# Patient Record
Sex: MALE | Race: WHITE | Employment: OTHER | URBAN - METROPOLITAN AREA
[De-identification: names, ages, dates, MRNs, and addresses within clinical notes are randomized per-mention and may not be internally consistent; named-entity substitution may affect disease eponyms.]

---

## 2021-09-24 ENCOUNTER — HOSPITAL ENCOUNTER (INPATIENT)
Age: 66
LOS: 4 days | Discharge: HOME HEALTH | DRG: 964 | End: 2021-09-28
Attending: EMERGENCY MEDICINE | Admitting: STUDENT IN AN ORGANIZED HEALTH CARE EDUCATION/TRAINING PROGRAM
Payer: MEDICARE

## 2021-09-24 ENCOUNTER — APPOINTMENT (OUTPATIENT)
Dept: CT IMAGING | Age: 66
DRG: 964 | End: 2021-09-24
Payer: MEDICARE

## 2021-09-24 ENCOUNTER — APPOINTMENT (OUTPATIENT)
Dept: GENERAL RADIOLOGY | Age: 66
DRG: 964 | End: 2021-09-24
Payer: MEDICARE

## 2021-09-24 DIAGNOSIS — S37.031A LACERATION OF RIGHT KIDNEY, INITIAL ENCOUNTER: ICD-10-CM

## 2021-09-24 DIAGNOSIS — S22.43XA CLOSED FRACTURE OF MULTIPLE RIBS OF BOTH SIDES, INITIAL ENCOUNTER: Primary | ICD-10-CM
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
## 2021-09-24 LAB
ALBUMIN SERPL-MCNC: 4.5 G/DL (ref 3.5–5.2)
ALP BLD-CCNC: 84 U/L (ref 40–129)
ALT SERPL-CCNC: 32 U/L (ref 0–40)
ANION GAP SERPL CALCULATED.3IONS-SCNC: 10 MMOL/L (ref 7–16)
AST SERPL-CCNC: 40 U/L (ref 0–39)
BASOPHILS ABSOLUTE: 0.03 E9/L (ref 0–0.2)
BASOPHILS RELATIVE PERCENT: 0.5 % (ref 0–2)
BILIRUB SERPL-MCNC: 0.4 MG/DL (ref 0–1.2)
BUN BLDV-MCNC: 19 MG/DL (ref 6–23)
CALCIUM SERPL-MCNC: 9.6 MG/DL (ref 8.6–10.2)
CHLORIDE BLD-SCNC: 106 MMOL/L (ref 98–107)
CO2: 28 MMOL/L (ref 22–29)
CREAT SERPL-MCNC: 1.2 MG/DL (ref 0.7–1.2)
EOSINOPHILS ABSOLUTE: 0.05 E9/L (ref 0.05–0.5)
EOSINOPHILS RELATIVE PERCENT: 0.8 % (ref 0–6)
GFR AFRICAN AMERICAN: >60
GFR NON-AFRICAN AMERICAN: >60 ML/MIN/1.73
GLUCOSE BLD-MCNC: 110 MG/DL (ref 74–99)
HCT VFR BLD CALC: 45.9 % (ref 37–54)
HEMOGLOBIN: 15.3 G/DL (ref 12.5–16.5)
IMMATURE GRANULOCYTES #: 0.06 E9/L
IMMATURE GRANULOCYTES %: 0.9 % (ref 0–5)
LYMPHOCYTES ABSOLUTE: 0.64 E9/L (ref 1.5–4)
LYMPHOCYTES RELATIVE PERCENT: 9.9 % (ref 20–42)
MCH RBC QN AUTO: 31 PG (ref 26–35)
MCHC RBC AUTO-ENTMCNC: 33.3 % (ref 32–34.5)
MCV RBC AUTO: 93.1 FL (ref 80–99.9)
MONOCYTES ABSOLUTE: 0.43 E9/L (ref 0.1–0.95)
MONOCYTES RELATIVE PERCENT: 6.7 % (ref 2–12)
NEUTROPHILS ABSOLUTE: 5.25 E9/L (ref 1.8–7.3)
NEUTROPHILS RELATIVE PERCENT: 81.2 % (ref 43–80)
PDW BLD-RTO: 13.2 FL (ref 11.5–15)
PLATELET # BLD: 183 E9/L (ref 130–450)
PMV BLD AUTO: 10.2 FL (ref 7–12)
POTASSIUM REFLEX MAGNESIUM: 4.8 MMOL/L (ref 3.5–5)
RBC # BLD: 4.93 E12/L (ref 3.8–5.8)
SODIUM BLD-SCNC: 144 MMOL/L (ref 132–146)
TOTAL PROTEIN: 6.8 G/DL (ref 6.4–8.3)
TROPONIN, HIGH SENSITIVITY: 12 NG/L (ref 0–11)
WBC # BLD: 6.5 E9/L (ref 4.5–11.5)

## 2021-09-24 PROCEDURE — 93005 ELECTROCARDIOGRAM TRACING: CPT | Performed by: EMERGENCY MEDICINE

## 2021-09-24 PROCEDURE — 96374 THER/PROPH/DIAG INJ IV PUSH: CPT

## 2021-09-24 PROCEDURE — 96375 TX/PRO/DX INJ NEW DRUG ADDON: CPT

## 2021-09-24 PROCEDURE — 72125 CT NECK SPINE W/O DYE: CPT

## 2021-09-24 PROCEDURE — 84484 ASSAY OF TROPONIN QUANT: CPT

## 2021-09-24 PROCEDURE — 6360000002 HC RX W HCPCS: Performed by: EMERGENCY MEDICINE

## 2021-09-24 PROCEDURE — 80053 COMPREHEN METABOLIC PANEL: CPT

## 2021-09-24 PROCEDURE — 99223 1ST HOSP IP/OBS HIGH 75: CPT | Performed by: SURGERY

## 2021-09-24 PROCEDURE — 99284 EMERGENCY DEPT VISIT MOD MDM: CPT

## 2021-09-24 PROCEDURE — 71260 CT THORAX DX C+: CPT

## 2021-09-24 PROCEDURE — 74177 CT ABD & PELVIS W/CONTRAST: CPT

## 2021-09-24 PROCEDURE — 70450 CT HEAD/BRAIN W/O DYE: CPT

## 2021-09-24 PROCEDURE — 85025 COMPLETE CBC W/AUTO DIFF WBC: CPT

## 2021-09-24 PROCEDURE — 6360000004 HC RX CONTRAST MEDICATION: Performed by: RADIOLOGY

## 2021-09-24 PROCEDURE — 1200000000 HC SEMI PRIVATE

## 2021-09-24 PROCEDURE — 71045 X-RAY EXAM CHEST 1 VIEW: CPT

## 2021-09-24 RX ORDER — MORPHINE SULFATE 4 MG/ML
4 INJECTION, SOLUTION INTRAMUSCULAR; INTRAVENOUS ONCE
Status: COMPLETED | OUTPATIENT
Start: 2021-09-24 | End: 2021-09-24

## 2021-09-24 RX ORDER — SODIUM CHLORIDE, SODIUM LACTATE, POTASSIUM CHLORIDE, CALCIUM CHLORIDE 600; 310; 30; 20 MG/100ML; MG/100ML; MG/100ML; MG/100ML
INJECTION, SOLUTION INTRAVENOUS CONTINUOUS
Status: DISCONTINUED | OUTPATIENT
Start: 2021-09-24 | End: 2021-09-25

## 2021-09-24 RX ORDER — FENTANYL CITRATE 50 UG/ML
50 INJECTION, SOLUTION INTRAMUSCULAR; INTRAVENOUS ONCE
Status: COMPLETED | OUTPATIENT
Start: 2021-09-24 | End: 2021-09-24

## 2021-09-24 RX ORDER — ASPIRIN 81 MG/1
81 TABLET ORAL DAILY
Status: ON HOLD | COMMUNITY
End: 2021-09-27 | Stop reason: HOSPADM

## 2021-09-24 RX ADMIN — FENTANYL CITRATE 50 MCG: 50 INJECTION INTRAMUSCULAR; INTRAVENOUS at 18:22

## 2021-09-24 RX ADMIN — MORPHINE SULFATE 4 MG: 4 INJECTION, SOLUTION INTRAMUSCULAR; INTRAVENOUS at 20:04

## 2021-09-24 RX ADMIN — IOPAMIDOL 90 ML: 755 INJECTION, SOLUTION INTRAVENOUS at 21:02

## 2021-09-24 ASSESSMENT — ENCOUNTER SYMPTOMS
VOMITING: 0
SHORTNESS OF BREATH: 0
ABDOMINAL PAIN: 1
EYE REDNESS: 0

## 2021-09-24 ASSESSMENT — PAIN SCALES - GENERAL
PAINLEVEL_OUTOF10: 8
PAINLEVEL_OUTOF10: 5

## 2021-09-24 ASSESSMENT — PAIN DESCRIPTION - LOCATION: LOCATION: BACK;NECK

## 2021-09-24 NOTE — ED PROVIDER NOTES
Chief complaint: Fall      HPI:  9/24/21, Time: 5:25 PM EDT    HPI               Sirena Donald is a 77 y.o. male presenting to the ED for fall. History is obtained from patient as well as the patient's medical record. The patient is presenting today for a fall. The patient states that he was working on scaffolding approximately 12 feet in the air and sustained a fall. He landed on a grass bank. He landed with his head down the bank. The patient does not remember how he fell. Patient son is present states he was unconscious for approximately 5 minutes he was confused for 15. Patient is currently at his baseline. Patient did have a c-collar in place. The patient does complain of pain in the right side of the abdomen well is in the epigastric region. The pain described as dull and aching. Worse with palpation and movement. No treatment for the pain prior to arrival.  This been constant since onset. He has not had any treatments for the pain prior to arrival.  The patient states he does not take any blood thinners takes 1 mg aspirin daily. ROS:   Review of Systems   Constitutional: Negative for chills and fever. HENT: Negative for congestion. Eyes: Negative for redness. Respiratory: Negative for shortness of breath. Cardiovascular: Positive for chest pain. Gastrointestinal: Positive for abdominal pain. Negative for vomiting. Genitourinary: Negative for dysuria. Musculoskeletal: Negative for arthralgias. Skin: Negative for rash. Neurological: Negative for light-headedness. Psychiatric/Behavioral: Negative for confusion. All other systems reviewed and are negative.      --------------------------------------------- PAST HISTORY ---------------------------------------------  Past Medical History:  has no past medical history on file. Past Surgical History:  has no past surgical history on file. Social History:  reports that he has never smoked.  He has never used smokeless tobacco. He reports that he does not drink alcohol and does not use drugs. Family History: family history is not on file. The patients home medications have been reviewed. Allergies: Fish-derived products    ---------------------------------------------------PHYSICAL EXAM--------------------------------------  Constitutional/General: Alert and oriented x3, well appearing, non toxic in NAD  Head: Normocephalic and atraumatic  Mouth: Oropharynx clear, handling secretions, no trismus  Neck: Supple, full ROM,  Pulmonary: Lungs clear to auscultation bilaterally, no wheezes, rales, or rhonchi. Not in respiratory distress  Cardiovascular:  Regular rate. Regular rhythm. No murmurs  Chest: no chest wall tenderness, no crepitus or fluid chest  Abdomen: Soft. Mild tenderness over the right side of the abdomen, no overlying bruising or ecchymosis. Musculoskeletal: Moves all extremities x 4. Warm and well perfused, no clubbing, cyanosis, or edema. Capillary refill <3 seconds  Skin: warm and dry. No rashes. Neurologic: GCS 15, no gross focal neurologic deficits  Psych: Normal Affect    -------------------------------------------------- RESULTS -------------------------------------------------  I have personally reviewed all laboratory and imaging results for this patient. Results are listed below.      LABS:  Results for orders placed or performed during the hospital encounter of 09/24/21   CBC Auto Differential   Result Value Ref Range    WBC 6.5 4.5 - 11.5 E9/L    RBC 4.93 3.80 - 5.80 E12/L    Hemoglobin 15.3 12.5 - 16.5 g/dL    Hematocrit 45.9 37.0 - 54.0 %    MCV 93.1 80.0 - 99.9 fL    MCH 31.0 26.0 - 35.0 pg    MCHC 33.3 32.0 - 34.5 %    RDW 13.2 11.5 - 15.0 fL    Platelets 744 067 - 781 E9/L    MPV 10.2 7.0 - 12.0 fL    Neutrophils % 81.2 (H) 43.0 - 80.0 %    Immature Granulocytes % 0.9 0.0 - 5.0 %    Lymphocytes % 9.9 (L) 20.0 - 42.0 %    Monocytes % 6.7 2.0 - 12.0 %    Eosinophils % 0.8 0.0 - 6.0 % Basophils % 0.5 0.0 - 2.0 %    Neutrophils Absolute 5.25 1.80 - 7.30 E9/L    Immature Granulocytes # 0.06 E9/L    Lymphocytes Absolute 0.64 (L) 1.50 - 4.00 E9/L    Monocytes Absolute 0.43 0.10 - 0.95 E9/L    Eosinophils Absolute 0.05 0.05 - 0.50 E9/L    Basophils Absolute 0.03 0.00 - 0.20 E9/L   Comprehensive Metabolic Panel w/ Reflex to MG   Result Value Ref Range    Sodium 144 132 - 146 mmol/L    Potassium reflex Magnesium 4.8 3.5 - 5.0 mmol/L    Chloride 106 98 - 107 mmol/L    CO2 28 22 - 29 mmol/L    Anion Gap 10 7 - 16 mmol/L    Glucose 110 (H) 74 - 99 mg/dL    BUN 19 6 - 23 mg/dL    CREATININE 1.2 0.7 - 1.2 mg/dL    GFR Non-African American >60 >=60 mL/min/1.73    GFR African American >60     Calcium 9.6 8.6 - 10.2 mg/dL    Total Protein 6.8 6.4 - 8.3 g/dL    Albumin 4.5 3.5 - 5.2 g/dL    Total Bilirubin 0.4 0.0 - 1.2 mg/dL    Alkaline Phosphatase 84 40 - 129 U/L    ALT 32 0 - 40 U/L    AST 40 (H) 0 - 39 U/L   Troponin   Result Value Ref Range    Troponin, High Sensitivity 12 (H) 0 - 11 ng/L       RADIOLOGY:  Interpreted by Radiologist.  CT Head WO Contrast   Final Result   No acute intracranial abnormality. CT CERVICAL SPINE WO CONTRAST   Final Result   No acute abnormality of the cervical spine. Degenerative changes most advanced at C5-C6. CT CHEST W CONTRAST   Final Result   Fractures of the right 7th, 8th, 11th and 12th ribs posteriorly. Fractures of the left 6 and 7th ribs laterally and of the 7th rib at the   costovertebral junction. Fracture through the T7 vertebral body, bordering its inferior vertebral   endplate with associated paraspinal hematoma at this level. Small right pleural effusion versus small right hemothorax. Laceration in the mid polar region of the right renal cortex extending to the   renal medulla collecting system consistent with grade 4 renal injury.    Perinephric collection extending posterior to the right kidney likely   representing urinoma measuring approximately 6 x 5.2 x 6.8 cm. Renal vascular injury cannot be excluded consider follow-up CTA for further   evaluation. Fracture of the right transverse process of L1. Findings reported to Dr. Sena Briceño at 10:04 p.m.         CT ABDOMEN PELVIS W IV CONTRAST Additional Contrast? None   Final Result   Fractures of the right 7th, 8th, 11th and 12th ribs posteriorly. Fractures of the left 6 and 7th ribs laterally and of the 7th rib at the   costovertebral junction. Fracture through the T7 vertebral body, bordering its inferior vertebral   endplate with associated paraspinal hematoma at this level. Small right pleural effusion versus small right hemothorax. Laceration in the mid polar region of the right renal cortex extending to the   renal medulla collecting system consistent with grade 4 renal injury. Perinephric collection extending posterior to the right kidney likely   representing urinoma measuring approximately 6 x 5.2 x 6.8 cm. Renal vascular injury cannot be excluded consider follow-up CTA for further   evaluation. Fracture of the right transverse process of L1. Findings reported to Dr. Sena Briceño at 10:04 p.m. XR CHEST PORTABLE   Final Result   Suspect early left basilar infiltrates. EKG:  This EKG is signed and interpreted by me. Normal sinus rhythm, rate of 73, no ST segment elevation or depression, WV interval 138 MS, QRS 86 MS,  MS  Interpreted by me      ------------------------- NURSING NOTES AND VITALS REVIEWED ---------------------------   The nursing notes within the ED encounter and vital signs as below have been reviewed by myself. BP (!) 152/71   Pulse 59   Temp 98.6 °F (37 °C) (Temporal)   Resp 14   Ht 6' (1.829 m)   Wt 285 lb (129.3 kg)   SpO2 97%   BMI 38.65 kg/m²   Oxygen Saturation Interpretation: Normal    The patients available past medical records and past encounters were reviewed. ------------------------------ ED COURSE/MEDICAL DECISION MAKING----------------------  Medications   sodium chloride flush 0.9 % injection 10 mL (has no administration in time range)   sodium chloride flush 0.9 % injection 10 mL (has no administration in time range)   0.9 % sodium chloride infusion (has no administration in time range)   ondansetron (ZOFRAN-ODT) disintegrating tablet 4 mg (has no administration in time range)     Or   ondansetron (ZOFRAN) injection 4 mg (has no administration in time range)   polyethylene glycol (GLYCOLAX) packet 17 g (has no administration in time range)   acetaminophen (TYLENOL) tablet 650 mg (650 mg Oral Given 9/25/21 0028)   oxyCODONE (ROXICODONE) immediate release tablet 5 mg ( Oral See Alternative 9/25/21 0028)     Or   oxyCODONE HCl (OXY-IR) immediate release tablet 10 mg (10 mg Oral Given 9/25/21 0028)   methocarbamol (ROBAXIN) tablet 1,000 mg (1,000 mg Oral Given 9/25/21 0028)   bisacodyl (DULCOLAX) EC tablet 5 mg (has no administration in time range)   sennosides-docusate sodium (SENOKOT-S) 8.6-50 MG tablet 1 tablet (1 tablet Oral Given 9/25/21 0028)   lactated ringers infusion ( IntraVENous New Bag 9/25/21 0029)   fentaNYL (SUBLIMAZE) injection 50 mcg (50 mcg IntraVENous Given 9/24/21 1822)   morphine sulfate (PF) injection 4 mg (4 mg IntraVENous Given 9/24/21 2004)   iopamidol (ISOVUE-370) 76 % injection 90 mL (90 mLs IntraVENous Given 9/24/21 2102)             Medical Decision Making:   I, Dr. Nicolas Jacome am the primary physician of record. Corinne Bees is a 77 y.o. male who presents to the ED for fall and abdominal pain. Differential diagnosis includes but not limited to rib fractures, intra-abdominal hemorrhage, intracranial hemorrhage. The patient did have labs and imaging which were reviewed, CBC, CMP unremarkable, high-sensitivity troponin unremarkable. Patient found to have bilateral rib fractures as well as concern for renal laceration.   Trauma service consultation. Patient will be admitted. Re-Evaluations/Consultations:              Patient is in the bed no acute distress. Results of today were discussed with the patient. Trauma services    This patient's ED course included: History, physical examination, reevaluation prior to disposition, labs, imaging, telemetry monitoring, EKG,       This patient has remained hemodynamically stable during their ED course. Counseling: The emergency provider has spoken with the patient and discussed todays results, in addition to providing specific details for the plan of care and counseling regarding the diagnosis and prognosis. Questions are answered at this time and they are agreeable with the plan.       --------------------------------- IMPRESSION AND DISPOSITION ---------------------------------    IMPRESSION  1. Closed fracture of multiple ribs of both sides, initial encounter    2. Laceration of right kidney, initial encounter        DISPOSITION  Disposition: Admit to telemetry  Patient condition is stable        NOTE: This report was transcribed using voice recognition software.  Every effort was made to ensure accuracy; however, inadvertent computerized transcription errors may be present         Kiara Warren DO  09/25/21 0040

## 2021-09-25 PROBLEM — S22.069A CLOSED T7 FRACTURE (HCC): Status: ACTIVE | Noted: 2021-09-25

## 2021-09-25 LAB
ANION GAP SERPL CALCULATED.3IONS-SCNC: 10 MMOL/L (ref 7–16)
ANION GAP SERPL CALCULATED.3IONS-SCNC: 9 MMOL/L (ref 7–16)
BUN BLDV-MCNC: 17 MG/DL (ref 6–23)
BUN BLDV-MCNC: 17 MG/DL (ref 6–23)
CALCIUM SERPL-MCNC: 8.7 MG/DL (ref 8.6–10.2)
CALCIUM SERPL-MCNC: 9.2 MG/DL (ref 8.6–10.2)
CHLORIDE BLD-SCNC: 104 MMOL/L (ref 98–107)
CHLORIDE BLD-SCNC: 104 MMOL/L (ref 98–107)
CO2: 26 MMOL/L (ref 22–29)
CO2: 27 MMOL/L (ref 22–29)
CREAT SERPL-MCNC: 1 MG/DL (ref 0.7–1.2)
CREAT SERPL-MCNC: 1 MG/DL (ref 0.7–1.2)
EKG ATRIAL RATE: 73 BPM
EKG P AXIS: 42 DEGREES
EKG P-R INTERVAL: 158 MS
EKG Q-T INTERVAL: 378 MS
EKG QRS DURATION: 86 MS
EKG QTC CALCULATION (BAZETT): 416 MS
EKG R AXIS: 23 DEGREES
EKG T AXIS: 33 DEGREES
EKG VENTRICULAR RATE: 73 BPM
GFR AFRICAN AMERICAN: >60
GFR AFRICAN AMERICAN: >60
GFR NON-AFRICAN AMERICAN: >60 ML/MIN/1.73
GFR NON-AFRICAN AMERICAN: >60 ML/MIN/1.73
GLUCOSE BLD-MCNC: 139 MG/DL (ref 74–99)
GLUCOSE BLD-MCNC: 152 MG/DL (ref 74–99)
HCT VFR BLD CALC: 40 % (ref 37–54)
HCT VFR BLD CALC: 40.8 % (ref 37–54)
HCT VFR BLD CALC: 42.1 % (ref 37–54)
HEMOGLOBIN: 13.1 G/DL (ref 12.5–16.5)
HEMOGLOBIN: 13.6 G/DL (ref 12.5–16.5)
HEMOGLOBIN: 13.6 G/DL (ref 12.5–16.5)
MCH RBC QN AUTO: 30.5 PG (ref 26–35)
MCH RBC QN AUTO: 30.7 PG (ref 26–35)
MCHC RBC AUTO-ENTMCNC: 32.3 % (ref 32–34.5)
MCHC RBC AUTO-ENTMCNC: 32.8 % (ref 32–34.5)
MCV RBC AUTO: 93.7 FL (ref 80–99.9)
MCV RBC AUTO: 94.4 FL (ref 80–99.9)
PDW BLD-RTO: 13.3 FL (ref 11.5–15)
PDW BLD-RTO: 13.4 FL (ref 11.5–15)
PLATELET # BLD: 143 E9/L (ref 130–450)
PLATELET # BLD: 146 E9/L (ref 130–450)
PMV BLD AUTO: 9.8 FL (ref 7–12)
PMV BLD AUTO: 9.9 FL (ref 7–12)
POTASSIUM REFLEX MAGNESIUM: 3.9 MMOL/L (ref 3.5–5)
POTASSIUM REFLEX MAGNESIUM: 4.5 MMOL/L (ref 3.5–5)
RBC # BLD: 4.27 E12/L (ref 3.8–5.8)
RBC # BLD: 4.46 E12/L (ref 3.8–5.8)
SODIUM BLD-SCNC: 139 MMOL/L (ref 132–146)
SODIUM BLD-SCNC: 141 MMOL/L (ref 132–146)
WBC # BLD: 6.4 E9/L (ref 4.5–11.5)
WBC # BLD: 8.4 E9/L (ref 4.5–11.5)

## 2021-09-25 PROCEDURE — 93010 ELECTROCARDIOGRAM REPORT: CPT | Performed by: INTERNAL MEDICINE

## 2021-09-25 PROCEDURE — 80048 BASIC METABOLIC PNL TOTAL CA: CPT

## 2021-09-25 PROCEDURE — 1200000000 HC SEMI PRIVATE

## 2021-09-25 PROCEDURE — 85027 COMPLETE CBC AUTOMATED: CPT

## 2021-09-25 PROCEDURE — 6370000000 HC RX 637 (ALT 250 FOR IP): Performed by: STUDENT IN AN ORGANIZED HEALTH CARE EDUCATION/TRAINING PROGRAM

## 2021-09-25 PROCEDURE — 36415 COLL VENOUS BLD VENIPUNCTURE: CPT

## 2021-09-25 PROCEDURE — 85018 HEMOGLOBIN: CPT

## 2021-09-25 PROCEDURE — 99222 1ST HOSP IP/OBS MODERATE 55: CPT | Performed by: NEUROLOGICAL SURGERY

## 2021-09-25 PROCEDURE — 85014 HEMATOCRIT: CPT

## 2021-09-25 PROCEDURE — 99233 SBSQ HOSP IP/OBS HIGH 50: CPT | Performed by: SURGERY

## 2021-09-25 PROCEDURE — 2580000003 HC RX 258: Performed by: STUDENT IN AN ORGANIZED HEALTH CARE EDUCATION/TRAINING PROGRAM

## 2021-09-25 RX ORDER — OXYCODONE HYDROCHLORIDE 10 MG/1
10 TABLET ORAL EVERY 4 HOURS PRN
Status: DISCONTINUED | OUTPATIENT
Start: 2021-09-25 | End: 2021-09-29 | Stop reason: HOSPADM

## 2021-09-25 RX ORDER — ONDANSETRON 4 MG/1
4 TABLET, ORALLY DISINTEGRATING ORAL EVERY 8 HOURS PRN
Status: DISCONTINUED | OUTPATIENT
Start: 2021-09-25 | End: 2021-09-29 | Stop reason: HOSPADM

## 2021-09-25 RX ORDER — SODIUM CHLORIDE 0.9 % (FLUSH) 0.9 %
10 SYRINGE (ML) INJECTION EVERY 12 HOURS SCHEDULED
Status: DISCONTINUED | OUTPATIENT
Start: 2021-09-25 | End: 2021-09-29 | Stop reason: HOSPADM

## 2021-09-25 RX ORDER — ONDANSETRON 2 MG/ML
4 INJECTION INTRAMUSCULAR; INTRAVENOUS EVERY 6 HOURS PRN
Status: DISCONTINUED | OUTPATIENT
Start: 2021-09-25 | End: 2021-09-29 | Stop reason: HOSPADM

## 2021-09-25 RX ORDER — OXYCODONE HYDROCHLORIDE 5 MG/1
5 TABLET ORAL EVERY 4 HOURS PRN
Status: DISCONTINUED | OUTPATIENT
Start: 2021-09-25 | End: 2021-09-29 | Stop reason: HOSPADM

## 2021-09-25 RX ORDER — SODIUM CHLORIDE 0.9 % (FLUSH) 0.9 %
10 SYRINGE (ML) INJECTION PRN
Status: DISCONTINUED | OUTPATIENT
Start: 2021-09-25 | End: 2021-09-29 | Stop reason: HOSPADM

## 2021-09-25 RX ORDER — ACETAMINOPHEN 325 MG/1
650 TABLET ORAL EVERY 6 HOURS SCHEDULED
Status: DISCONTINUED | OUTPATIENT
Start: 2021-09-25 | End: 2021-09-29 | Stop reason: HOSPADM

## 2021-09-25 RX ORDER — POLYETHYLENE GLYCOL 3350 17 G/17G
17 POWDER, FOR SOLUTION ORAL DAILY
Status: DISCONTINUED | OUTPATIENT
Start: 2021-09-25 | End: 2021-09-29 | Stop reason: HOSPADM

## 2021-09-25 RX ORDER — METHOCARBAMOL 500 MG/1
1000 TABLET, FILM COATED ORAL 4 TIMES DAILY
Status: DISCONTINUED | OUTPATIENT
Start: 2021-09-25 | End: 2021-09-29 | Stop reason: HOSPADM

## 2021-09-25 RX ORDER — SODIUM CHLORIDE 9 MG/ML
25 INJECTION, SOLUTION INTRAVENOUS PRN
Status: DISCONTINUED | OUTPATIENT
Start: 2021-09-25 | End: 2021-09-29 | Stop reason: HOSPADM

## 2021-09-25 RX ORDER — SENNA AND DOCUSATE SODIUM 50; 8.6 MG/1; MG/1
1 TABLET, FILM COATED ORAL 2 TIMES DAILY
Status: DISCONTINUED | OUTPATIENT
Start: 2021-09-25 | End: 2021-09-29 | Stop reason: HOSPADM

## 2021-09-25 RX ADMIN — ACETAMINOPHEN 650 MG: 325 TABLET ORAL at 17:02

## 2021-09-25 RX ADMIN — OXYCODONE 5 MG: 5 TABLET ORAL at 13:29

## 2021-09-25 RX ADMIN — OXYCODONE HYDROCHLORIDE 10 MG: 10 TABLET ORAL at 18:09

## 2021-09-25 RX ADMIN — METHOCARBAMOL TABLETS 1000 MG: 500 TABLET, COATED ORAL at 17:03

## 2021-09-25 RX ADMIN — DOCUSATE SODIUM 50 MG AND SENNOSIDES 8.6 MG 1 TABLET: 8.6; 5 TABLET, FILM COATED ORAL at 09:04

## 2021-09-25 RX ADMIN — OXYCODONE HYDROCHLORIDE 10 MG: 10 TABLET ORAL at 00:28

## 2021-09-25 RX ADMIN — METHOCARBAMOL TABLETS 1000 MG: 500 TABLET, COATED ORAL at 09:04

## 2021-09-25 RX ADMIN — METHOCARBAMOL TABLETS 1000 MG: 500 TABLET, COATED ORAL at 13:29

## 2021-09-25 RX ADMIN — ACETAMINOPHEN 650 MG: 325 TABLET ORAL at 13:29

## 2021-09-25 RX ADMIN — Medication 10 ML: at 20:58

## 2021-09-25 RX ADMIN — BISACODYL 5 MG: 5 TABLET, COATED ORAL at 09:04

## 2021-09-25 RX ADMIN — SODIUM CHLORIDE, POTASSIUM CHLORIDE, SODIUM LACTATE AND CALCIUM CHLORIDE: 600; 310; 30; 20 INJECTION, SOLUTION INTRAVENOUS at 00:29

## 2021-09-25 RX ADMIN — OXYCODONE 5 MG: 5 TABLET ORAL at 09:04

## 2021-09-25 RX ADMIN — ACETAMINOPHEN 650 MG: 325 TABLET ORAL at 00:28

## 2021-09-25 RX ADMIN — DOCUSATE SODIUM 50 MG AND SENNOSIDES 8.6 MG 1 TABLET: 8.6; 5 TABLET, FILM COATED ORAL at 00:28

## 2021-09-25 RX ADMIN — DOCUSATE SODIUM 50 MG AND SENNOSIDES 8.6 MG 1 TABLET: 8.6; 5 TABLET, FILM COATED ORAL at 20:58

## 2021-09-25 RX ADMIN — METHOCARBAMOL TABLETS 1000 MG: 500 TABLET, COATED ORAL at 20:58

## 2021-09-25 RX ADMIN — METHOCARBAMOL TABLETS 1000 MG: 500 TABLET, COATED ORAL at 00:28

## 2021-09-25 ASSESSMENT — PAIN DESCRIPTION - PROGRESSION
CLINICAL_PROGRESSION: NOT CHANGED
CLINICAL_PROGRESSION: NOT CHANGED

## 2021-09-25 ASSESSMENT — PAIN DESCRIPTION - DESCRIPTORS
DESCRIPTORS: ACHING;CONSTANT;DISCOMFORT
DESCRIPTORS: ACHING;SORE
DESCRIPTORS: ACHING;CONSTANT;DISCOMFORT
DESCRIPTORS: ACHING;SORE

## 2021-09-25 ASSESSMENT — PAIN DESCRIPTION - PAIN TYPE
TYPE: ACUTE PAIN

## 2021-09-25 ASSESSMENT — ENCOUNTER SYMPTOMS
CONSTIPATION: 0
SHORTNESS OF BREATH: 0
ALLERGIC/IMMUNOLOGIC NEGATIVE: 1
ABDOMINAL PAIN: 1
BLOOD IN STOOL: 0
ANAL BLEEDING: 0
ABDOMINAL DISTENTION: 0
BACK PAIN: 1
RESPIRATORY NEGATIVE: 1
DIARRHEA: 0
VOMITING: 0
NAUSEA: 0
EYES NEGATIVE: 1
COUGH: 0

## 2021-09-25 ASSESSMENT — PAIN - FUNCTIONAL ASSESSMENT
PAIN_FUNCTIONAL_ASSESSMENT: PREVENTS OR INTERFERES SOME ACTIVE ACTIVITIES AND ADLS
PAIN_FUNCTIONAL_ASSESSMENT: PREVENTS OR INTERFERES SOME ACTIVE ACTIVITIES AND ADLS

## 2021-09-25 ASSESSMENT — PAIN SCALES - GENERAL
PAINLEVEL_OUTOF10: 7
PAINLEVEL_OUTOF10: 4
PAINLEVEL_OUTOF10: 5
PAINLEVEL_OUTOF10: 10
PAINLEVEL_OUTOF10: 7
PAINLEVEL_OUTOF10: 5

## 2021-09-25 ASSESSMENT — PAIN DESCRIPTION - LOCATION
LOCATION: BACK;CHEST

## 2021-09-25 ASSESSMENT — PAIN DESCRIPTION - ONSET
ONSET: ON-GOING
ONSET: GRADUAL

## 2021-09-25 ASSESSMENT — PAIN DESCRIPTION - FREQUENCY
FREQUENCY: INTERMITTENT
FREQUENCY: CONTINUOUS

## 2021-09-25 NOTE — DISCHARGE SUMMARY
Physician Discharge Summary     Patient ID:  Porsha Mark  [id number]  36 y.o.  1955    Admit date: 9/24/2021    Discharge date and time: 9/28/2021  6:00 PM     Admitting Physician: Mayra Hernandez MD     Admission Diagnoses: Fall (on) (from) unspecified stairs and steps, initial encounter [W10. 9XXA]    Discharge Diagnoses: Active Problems:    Fall (on) (from) unspecified stairs and steps, initial encounter    Kidney laceration, right    Multiple closed fractures of ribs of both sides    Closed T7 fracture (HCC)  Resolved Problems:    * No resolved hospital problems. *      Admission Condition: fair    Discharged Condition: stable    Indication for Admission: [address] Course/Procedures/Operation/treatments:   9/24: fall from 12 ft, sustained bilateral rib fx's, possible grade 4 renal lac (versus cyst), T7 back fx  9/25: await Hgb (first 2 checks were stable), start diet, SMI 2250  9/26: [address], tolerating diet. NSG recommends TLSO which patient is wearing. PT/OT evals today. H/H stable. 9/27: [address] ~[id number]. Feels well this morning with no active complaints. Last BM Friday, requesting additional bowel regimen. Tolerating diet. 9/28: [address] this morning. No acute events overnight. He was able to make arrangements to have family at home to help him when he gets there. He is ready to go home today. Pain is well controlled. Tolerating diet. Consults:   IP CONSULT TO TRAUMA SURGERY  IP CONSULT TO NEUROSURGERY  INPATIENT CONSULT TO ORTHOTIST/PROSTHETIST    Significant Diagnostic Studies:   CT Head WO Contrast    Result Date: 9/24/2021  EXAMINATION: CT OF THE HEAD WITHOUT CONTRAST  9/24/2021 9:21 pm TECHNIQUE: CT of the head was performed without the administration of intravenous contrast. Dose modulation, iterative reconstruction, and/or weight based adjustment of the mA/kV was utilized to reduce the radiation dose to as low as reasonably achievable. COMPARISON: None.  HISTORY: ORDERING SYSTEM PROVIDED HISTORY: fall TECHNOLOGIST PROVIDED HISTORY: Reason for exam:->fall Has a \"code stroke\" or \"stroke alert\" been called? ->No Decision Support Exception - unselect if not a suspected or confirmed emergency medical condition->Emergency Medical Condition (MA) What reading provider will be dictating this exam?->CRC FINDINGS: BRAIN/VENTRICLES: There is no acute intracranial hemorrhage, mass effect or midline shift. No abnormal extra-axial fluid collection. The gray-white differentiation is maintained without evidence of an acute infarct. There is no evidence of hydrocephalus. ORBITS: The visualized portion of the orbits demonstrate no acute abnormality. SINUSES: The visualized paranasal sinuses and mastoid air cells demonstrate no acute abnormality. SOFT TISSUES/SKULL:  No acute abnormality of the visualized skull or soft tissues. No acute intracranial abnormality. CT CHEST W CONTRAST    Result Date: 9/24/2021  EXAMINATION: CT OF THE CHEST WITH CONTRAST; CT OF THE ABDOMEN AND PELVIS WITH CONTRAST 9/24/2021 8:21 pm TECHNIQUE: CT of the chest was performed with the administration of intravenous contrast. Multiplanar reformatted images are provided for review. Dose modulation, iterative reconstruction, and/or weight based adjustment of the mA/kV was utilized to reduce the radiation dose to as low as reasonably achievable.; CT of the abdomen and pelvis was performed with the administration of intravenous contrast. Multiplanar reformatted images are provided for review. Dose modulation, iterative reconstruction, and/or weight based adjustment of the mA/kV was utilized to reduce the radiation dose to as low as reasonably achievable.  COMPARISON: None HISTORY: ORDERING SYSTEM PROVIDED HISTORY: pain TECHNOLOGIST PROVIDED HISTORY: Reason for exam:->pain Decision Support Exception - unselect if not a suspected or confirmed emergency medical condition->Emergency Medical Condition (MA) What reading provider will be dictating this exam?->CRC; ORDERING SYSTEM PROVIDED HISTORY: pain TECHNOLOGIST PROVIDED HISTORY: Reason for exam:->pain Additional Contrast?->None Decision Support Exception - unselect if not a suspected or confirmed emergency medical condition->Emergency Medical Condition (MA) What reading provider will be dictating this exam?->CRC FINDINGS: Chest: Mediastinum: No evidence of mediastinal hematoma. Normal thoracic aorta. No pericardial effusion. Lungs/pleura: No evidence of pneumothorax, or lung contusion. Atelectatic changes in the dependent portion of the lungs bilaterally. Small right pleural effusion versus small right hemothorax. Soft Tissues/Bones: Fractures of the right 7th, 8th, 11th and 12th ribs posteriorly. Fractures of the left 6 and 7th ribs laterally and of the 7th rib at the costovertebral junction. Fracture through the T7 vertebral body, bordering its inferior vertebral endplate with associated paraspinal hematoma at this level. Abdomen/Pelvis: Organs: The liver and spleen are within normal limits. Adrenals, pancreas and gallbladder within normal limits. Laceration in the mid polar region of the right renal cortex extending to the renal medulla collecting system consistent with grade 4 renal injury. Perinephric collection extending posterior to the right kidney likely representing urinoma measuring approximately 6 x 5.2 x 6.8 cm. Renal vascular injury cannot be excluded. GI/Bowel: No bowel obstruction or free intraperitoneal air. No mesenteric or intramural hematoma. Pelvis: Distended urinary bladder. Enlarged prostate impressing on the base of the urinary bladder. No evidence of free fluid in the pelvis or hemoperitoneum. Peritoneum/Retroperitoneum: No retroperitoneal hematoma. Normal abdominal aorta. Bones/Soft Tissues: No evidence of acute pelvic or hip fracture. Fracture of the right transverse process of L1. Fractures of the right 7th, 8th, 11th and 12th ribs posteriorly.  Fractures of the left 6 and 7th ribs laterally and of the 7th rib at the costovertebral junction. Fracture through the T7 vertebral body, bordering its inferior vertebral endplate with associated paraspinal hematoma at this level. Small right pleural effusion versus small right hemothorax. Laceration in the mid polar region of the right renal cortex extending to the renal medulla collecting system consistent with grade 4 renal injury. Perinephric collection extending posterior to the right kidney likely representing urinoma measuring approximately 6 x 5.2 x 6.8 cm. Renal vascular injury cannot be excluded consider follow-up CTA for further evaluation. Fracture of the right transverse process of L1. Findings reported to Dr. Ese Christine at 10:04 p.m.     CT CERVICAL SPINE WO CONTRAST    Result Date: 9/24/2021  EXAMINATION: CT OF THE CERVICAL SPINE WITHOUT CONTRAST 9/24/2021 9:21 pm TECHNIQUE: CT of the cervical spine was performed without the administration of intravenous contrast. Multiplanar reformatted images are provided for review. Dose modulation, iterative reconstruction, and/or weight based adjustment of the mA/kV was utilized to reduce the radiation dose to as low as reasonably achievable. COMPARISON: None. HISTORY: ORDERING SYSTEM PROVIDED HISTORY: pain TECHNOLOGIST PROVIDED HISTORY: Reason for exam:->pain Decision Support Exception - unselect if not a suspected or confirmed emergency medical condition->Emergency Medical Condition (MA) What reading provider will be dictating this exam?->CRC FINDINGS: BONES/ALIGNMENT: There is no acute fracture or traumatic malalignment. There is vertically oriented linear lucency through the posterior tip T1 spinous process, likely from old injury. DEGENERATIVE CHANGES: There are degenerative changes most advanced at C5-C6 with disc space narrowing and marginal bony spur formation. SOFT TISSUES: There is no prevertebral soft tissue swelling. No acute abnormality of the cervical spine. Degenerative changes most advanced at C5-C6. CT ABDOMEN PELVIS W IV CONTRAST Additional Contrast? None    Result Date: 9/24/2021  EXAMINATION: CT OF THE CHEST WITH CONTRAST; CT OF THE ABDOMEN AND PELVIS WITH CONTRAST 9/24/2021 8:21 pm TECHNIQUE: CT of the chest was performed with the administration of intravenous contrast. Multiplanar reformatted images are provided for review. Dose modulation, iterative reconstruction, and/or weight based adjustment of the mA/kV was utilized to reduce the radiation dose to as low as reasonably achievable.; CT of the abdomen and pelvis was performed with the administration of intravenous contrast. Multiplanar reformatted images are provided for review. Dose modulation, iterative reconstruction, and/or weight based adjustment of the mA/kV was utilized to reduce the radiation dose to as low as reasonably achievable. COMPARISON: None HISTORY: ORDERING SYSTEM PROVIDED HISTORY: pain TECHNOLOGIST PROVIDED HISTORY: Reason for exam:->pain Decision Support Exception - unselect if not a suspected or confirmed emergency medical condition->Emergency Medical Condition (MA) What reading provider will be dictating this exam?->CRC; ORDERING SYSTEM PROVIDED HISTORY: pain TECHNOLOGIST PROVIDED HISTORY: Reason for exam:->pain Additional Contrast?->None Decision Support Exception - unselect if not a suspected or confirmed emergency medical condition->Emergency Medical Condition (MA) What reading provider will be dictating this exam?->CRC FINDINGS: Chest: Mediastinum: No evidence of mediastinal hematoma. Normal thoracic aorta. No pericardial effusion. Lungs/pleura: No evidence of pneumothorax, or lung contusion. Atelectatic changes in the dependent portion of the lungs bilaterally. Small right pleural effusion versus small right hemothorax. Soft Tissues/Bones: Fractures of the right 7th, 8th, 11th and 12th ribs posteriorly.  Fractures of the left 6 and 7th ribs laterally and of the 7th rib at the ONE XRAY VIEW OF THE CHEST 9/24/2021 6:08 pm COMPARISON: None. HISTORY: ORDERING SYSTEM PROVIDED HISTORY: fall, pain TECHNOLOGIST PROVIDED HISTORY: Reason for exam:->fall, pain What reading provider will be dictating this exam?->CRC FINDINGS: There is mild opacity at the left lung base. Right lung is clear. The heart is mildly enlarged. No pneumothorax. The costophrenic angles are clear. Suspect early left basilar infiltrates. Discharge Exam:  Gen - no apparent distress   Neuro - Awake, alert, attentive    HEENT - PERRL 3,, conj pink   Lungs - non labored, BS clear b/l    Heart - RR no extra heart sounds    Abdomen - NAD, obese,   Ext- rom wnl NVI      Disposition: home    In process/preliminary results:  Outstanding Order Results       No orders found from 8/26/2021 to 9/25/2021. Patient Instructions:   Discharge Medication List as of 9/28/2021 10:19 AM             Details   oxyCODONE (ROXICODONE) 5 MG immediate release tablet Take 1 tablet by mouth every 6 hours as needed for Pain for up to 5 days. , Disp-20 tablet, R-0Normal      tiZANidine (ZANAFLEX) 2 MG tablet Take 1 tablet by mouth every 6 hours as needed (pain), Disp-28 tablet, R-0Normal             TRAUMA SERVICES DISCHARGE INSTRUCTIONS    Call [phone], option 2, for any questions/concerns and for follow-up appointment in 1 week(s). Please follow the instructions checked below:    Please follow-up with your primary care provider. ACTIVITY INSTRUCTIONS  Increase activity as tolerated  No heavy lifting or strenuous activity  Take your incentive spirometer home and use 4-6 times/day   [x]  No driving until cleared by trauma clinic     WOUND/DRESSING INSTRUCTIONS:  You may shower. No sitting in bath tub, hot tub or swimming until cleared by physician. Ice to areas of pain for first 24 hours. Heat to areas of pain after that. Wash areas of lacerations/abrasions with soap & water. Rinse well.   Pat dry with clean towel. Apply thin layer of Bacitracin, Neosporin, or triple antibiotic cream to affected area 2-3 times per day. Keep wounds clean and dry. MEDICATION INSTRUCTIONS  Take medication as prescribed. When taking pain medications, you may experience dizziness or drowsiness. Do not drink alcohol or drive when taking these medications. You may experience constipation while taking pain medication. You may take over the counter stool softeners such as docusate (Colace), sennosides S (Senokot-S), or Miralax.   []  You may take Ibuprofen (over the counter) as directed for mild pain. --You may take up to 800mg every 8 hours for pain, please take with food or milk. [x]  You may take acetaminophen (Tylenol) products. Do NOT take more than 4000mg of Tylenol in 24h. []  Do not take any other acetaminophen (Tylenol) products if you are taking Percocet or Norco, as these contain Tylenol. --Do NOT take more than 4000mg of Tylenol in 24h. OPIOID MEDICATION INSTRUCTIONS  Read the medication guide that is included with your prescription. Take your medication exactly as prescribed. Store medication away from children and in a safe place. Do NOT share your medication with others. Do NOT take medication unless it is prescribed for you. Do NOT drink alcohol while taking opioids (I.e., Norco, Percocet, Oxycodone, etc). Discuss with the Trauma Clinic staff if the dose of medication you are taking does not control your pain and any side effects that you may be having. CALL 911 OR YOUR LOCAL EMERGENCY SERVICE:  --If you take too much medication  --If you have trouble breathing or shortness of breath  --A child has taken this medication. WORK:  You may not return to work until you receive follow-up with the Trauma Clinic or clearance by all consultants.     Call the trauma clinic for any of the following or for questions/concerns;  --fever over 101F  --redness, swelling, hardness or warmth at the wound site(s). --Unrelieved nausea/vomiting  --Foul smelling or cloudy drainage at the wound site(s)  --Unrelieved pain or increase in pain  --Increase in shortness of breath    Follow-up:  Trauma Clinic: [phone] option [address]  [place], [phone] [place]    Follow up:   [address]  [address] [facility]  [phone]  Schedule an appointment as soon as possible for a visit in 1 week      Jonne Boast, MD  [address] [place] [address]  [phone]    In 4 weeks  with upright xray       Signed:    Donato Morales,    Transitional Year, PGY-1

## 2021-09-25 NOTE — H&P
TRAUMA HISTORY & PHYSICAL  Surgical Resident/Advance Practice Nurse  9/24/2021  10:09 PM    PRIMARY SURVEY    CHIEF COMPLAINT:  Trauma consult. Injury occurred just prior to arrival.  Patient fell off of a 12 foot scaffold and had a loss of consciousness for roughly 5 minutes. Patient is presented by his son who was there at the event. Complaining of right-sided pain. AIRWAY:   Airway Normal  EMS ETT Absent  Noisy respirations Absent  Retractions: Absent  Vomiting/bleeding: Absent      BREATHING:    Midaxillary breath sound left:  Normal  Midaxillary breath sound right:  Normal    Cough sound intensity:  good   FiO2:room air   SMI 2500 mL.       CIRCULATION:   Femerol pulse intensity: Strong  Palpebral conjunctiva: white      Vitals:    09/24/21 2125   BP: 137/79   Pulse: 76   Resp: 20   Temp:    SpO2: 97%       Vitals:    09/24/21 1709 09/24/21 2004 09/24/21 2125 09/24/21 2128   BP: (!) 169/96 (!) 142/74 137/79    Pulse: 83 79 76    Resp: 16 16 20    Temp: 100.2 °F (37.9 °C)      TempSrc: Oral      SpO2: 95% 96% 97%    Weight:    285 lb (129.3 kg)   Height:    6' (1.829 m)        FAST EXAM: none    Central Nervous System    GCS Initial 15 minutes   Eye  Motor  Verbal 4 - Opens eyes on own  6 - Follows simple motor commands  5 - Alert and oriented 4 - Opens eyes on own  6 - Follows simple motor commands  5 - Alert and oriented     Neuromuscular blockade: No  Pupil size:  Left 3 mm    Right 3 mm  Pupil reaction: Yes    Wiggles fingers: Left Yes Right Yes  Wiggles toes: Left Yes   Right Yes    Hand grasp:   Left  Present      Right  Present  Plantar flexion: Left  Present      Right   Present    Loss of consciousness:  Yes  History Obtained From:  Patient & EMS  Private Medical Doctor: unknown    Pre-exisiting Medical History:  yes    Conditions: HTN    Medications: baby aspirin     Allergies: fish-derivatives    Social History:   Tobacco use:  none  Alcohol use:  social drinker  Illicit drug use:  no history of illicit drug use    Past Surgical History: None    Anticoagulant use:  No   Antiplatelet use:    Yes baby aspirin    NSAID use in last 72 hours: yes  Taken PCN in past:  yes  Last food/drink: This morning  Last tetanus: Unknown does not think within the last 10 years    Family History:   Not pertinent to presenting problem. Complaints:   Head:  None  Neck:   None  Chest:   Moderate  Back:   None  Abdomen:   None  Extremities:   None  Comments:       Review of systems:  All negative unless otherwise noted. SECONDARY SURVEY  Head/scalp: Atraumatic    Face: Atraumatic    Eyes/ears/nose: Atraumatic    Pharynx/mouth: Atraumatic    Neck: Atraumatic     Cervical spine tenderness:   Cervical collar not indicated  Pain:  none  ROM: Normal    Chest wall:  Atraumatic    Heart:  Regular rate & rhythm    Abdomen: Atraumatic. Soft ND  Tenderness:  none    Pelvis: Atraumatic  Tenderness: none    Thoracolumbar spine: Atraumatic  Tenderness:  none    Genitourinary:  Atraumatic. No blood or urine noted    Rectum: Atraumatic. No blood noted. Perineum: Atraumatic. No blood or urine noted. Extremities:   Sensory normal  Motor normal    Distal Pulses  Left arm normal  Right arm normal  Left leg normal  Right leg normal    Capillary refill  Left arm normal  Right arm normal  Left leg normal  Right leg normal    Procedures in ED:  None    In the event of Emergency Blood Transfusion:  Due to the critical condition of this patient, I request the immediate release of blood products for emergency transfusion secondary to shock. I understand the increased risks incurred by the lack of complete transfusion testing.       Radiology: Chest Xray, Pelvic Xray, Ct head, Ct cervical spine, CT chest, CT abdomen    Consultations:  None    Admission/Diagnosis: Trauma, fall    Plan of Treatment:  Follow-up scans  Follow-up labs  Will trend hemoglobin given grade 4 kidney laceration  Pain control  Pulmonary toileting      Plan discussed with Dr. Nory Kimball at 9/24/2021 on 10:09 PM    Electronically signed by Monserrat Kwok DO on 9/24/2021 at 10:09 PM

## 2021-09-25 NOTE — PROGRESS NOTES
[facility]  PROGRESS NOTE  ATTENDING NOTE        TRAUMA  MECHANISM:  Fall 12'    Chief Complaint   Patient presents with    Fall     from 12 ft ladder to grass. Son was present but did not witness. +LOC, - thinners. Also c/o chest discomfort. C collared on arrival       HPI  The patient is a 76 y/o male who sustained a fall 12' at approximately 1700. The patient reported  acute, constant  sharp pain localized to the back that started immediately. The intensity of the pain is 6/10. Pain does not radiate. There are no alleviating or worsening factors regarding the pain. The patient was transported by EMS to the [address] [facility] from scene. Evaluation prior to arrival included: H&P, CT head, c-spine, c/a/p. Treatment prior to arrival included: analgesia. A trauma consult was requested to assist, guide,  and expedite further evaluation and treatment for the patient. Patient Active Problem List   Diagnosis    Fall (on) (from) unspecified stairs and steps, initial encounter    Kidney laceration, right    Multiple closed fractures of ribs of both sides    Closed T7 fracture ([facility] [address]       SUBJECTIVE/OVERNIGHT EVENTS:  Admitted, pain improved    Review of Systems   Constitutional: Positive for activity change. Negative for appetite change and unexpected weight change. HENT: Negative. Eyes: Negative. Respiratory: Negative. Negative for cough and shortness of breath. Cardiovascular: Negative. Negative for chest pain and leg swelling. Gastrointestinal: Positive for abdominal pain. Negative for abdominal distention, anal bleeding, blood in stool, constipation, diarrhea, nausea and vomiting. Endocrine: Negative. Genitourinary: Negative. Musculoskeletal: Positive for back pain, gait problem and myalgias. Negative for arthralgias and joint swelling. Skin: Negative. Allergic/Immunologic: Negative.     Neurological: Negative for dizziness, weakness and headaches. Hematological: Negative. Psychiatric/Behavioral: Negative. Negative for confusion, decreased concentration and sleep disturbance. /62   Pulse 52   Temp 97.8 °F (36.6 °C) (Temporal)   Resp 16   Ht 6' (1.829 m)   Wt 285 lb (129.3 kg)   SpO2 95%   BMI 38.65 kg/m²   Physical Exam  Constitutional:       Appearance: Normal appearance. He is obese. HENT:      Head: Normocephalic and atraumatic. Nose: Nose normal.      Mouth/Throat:      Mouth: Mucous membranes are moist.      Pharynx: Oropharynx is clear. Eyes:      Extraocular Movements: Extraocular movements intact. Pupils: Pupils are equal, round, and reactive to light. Cardiovascular:      Rate and Rhythm: Normal rate and regular rhythm. Pulses: Normal pulses. Heart sounds: Normal heart sounds. Pulmonary:      Effort: Pulmonary effort is normal.      Breath sounds: Normal breath sounds. Abdominal:      General: There is no distension. Palpations: Abdomen is soft. Tenderness: There is abdominal tenderness. Musculoskeletal:         General: Tenderness and signs of injury present. Cervical back: Normal range of motion and neck supple. Skin:     General: Skin is warm and dry. Neurological:      General: No focal deficit present. Mental Status: He is alert and oriented to person, place, and time. Psychiatric:         Mood and Affect: Mood normal.         Behavior: Behavior normal.         Thought Content: Thought content normal.         Judgment: Judgment normal.         ASSESSMENT/PLAN:  1. G4 Right Kidney laceration + cyst--monitor H/H, monitor UOP  2. Bilateral rib fractures--analgesia, pulmonary toilet  3. Small hemothorax--f/u CXR 9/26  4. T7 compression fracture--NSGY c/s  5. L1 tp fracture--analgesia.    6.  Morbid obesity, BM 38--diet, exercise education    INCIDENTAL FINDINGS:  none    AMPAC:  TBD/24    DVT/GI ppx:  Bilateral Sae Araiza MD, MSc, FACS  9/25/2021  10:35 AM

## 2021-09-25 NOTE — PROGRESS NOTES
and 12th ribs posteriorly. Fractures of the left 6 and 7th ribs laterally and of the 7th rib at the   costovertebral junction. Fracture through the T7 vertebral body, bordering its inferior vertebral   endplate with associated paraspinal hematoma at this level. Small right pleural effusion versus small right hemothorax. Laceration in the mid polar region of the right renal cortex extending to the   renal medulla collecting system consistent with grade 4 renal injury. Perinephric collection extending posterior to the right kidney likely   representing urinoma measuring approximately 6 x 5.2 x 6.8 cm. Renal vascular injury cannot be excluded consider follow-up CTA for further   evaluation. Fracture of the right transverse process of L1. Findings reported to Dr. Kentrell Campbell at 10:04 p.m. XR CHEST PORTABLE   Final Result   Suspect early left basilar infiltrates. PHYSICAL EXAM:   GCS:  4 - Opens eyes on own   6 - Follows simple motor commands  5 - Alert and oriented    Pupil size: Left 3 mm     Right 3 mm  Pupil reaction: Yes  Wiggles fingers: Left Yes     Right Yes  Wiggles toes: Left Yes     Right Yes  Plantar flexion: Left normal     Right normal    GENERAL:  NAD. A&Ox3. HEAD:  Normocephalic, atraumatic. LUNGS:  No increased work of breathing. [facility] 2250  CARDIOVASCULAR: Normal rate, regular rhythm  ABDOMEN:  Soft, non-distended, non-tender. No guarding, rigidity, rebound. EXTREMITIES:  MAEx4. No LE edema. Atraumatic. SKIN:  Warm and dry       Spine:    no pain/paresthesia w/ palpation & full active ROM.  CT negative for fracture    Spine Tenderness ROM   Cervical 0 /10 Normal   Thoracic 0 /10 Normal   Lumbar 0 /10 Normal     Musculoskeletal:    Joint Tenderness Swelling/Deformity ROM   Right shoulder absent absent normal   Left shoulder absent absent normal   Right elbow absent absent normal   Left elbow absent absent normal   Right wrist absent absent normal Left wrist absent absent normal   Right hand grasp absent absent normal   Left hand grasp absent absent normal   Right hip absent absent normal   Left hip absent absent normal   Right knee absent absent normal   Left knee absent absent normal   Right ankle absent absent normal   Left ankle absent absent normal   Right foot absent absent normal   Left foot absent absent normal         CONSULTS: none      Active Problems:    Fall (on) (from) unspecified stairs and steps, initial encounter  Resolved Problems:    * No resolved hospital problems. *        Assessment/Plan:     Neuro:  concussion. GCS 15. T7 fracture neurosurgery consult. Pain control. CV: No acute issues. Pulm: Rib fractures bilaterally (R [id number]. L 6,7). Small R SAMMIE. Encourage IS/SMI. GI: No acute issues. Bowel regimen. Zofran PRN. Renal: Possible grade 4 right renal laceration versus cyst/urinoma, follow BMP's. Endocrine: Mild hyperglycemia, monitor daily  MSK: No acute issues. PT/OT. Heme: Monitor Hgb trend given possible renal lac. ID: No acute issues.     Pain/Analgesia: tylenol, robaxin, oxy  Bowel Regimen: PO dulcolax, pericolace, miralax  DVT PPx:  SCDs, await until 9/26 for chemical ppx  GI PPx:  none    Code status:  Full Code    Disposition:  Pending Hgb and NSG consult and [id number] Damion Donovan MD  9/25/2021  8:07 AM

## 2021-09-25 NOTE — PROGRESS NOTES
Physical Therapy    PT order received and medical chart reviewed 9/25 AM. Holding for neurosurgical recommendations/POC. Will follow. Thank you.     Garima Dobbins, PT, DPT  [id number]

## 2021-09-25 NOTE — CONSULTS
[unfilled]       Chief Complaint:   Chief Complaint   Patient presents with    Fall     from 12 ft ladder to grass. Son was present but did not witness. +LOC, - thinners. Also c/o chest discomfort. C collared on arrival       HPI:     I had the pleasure of seeing Sophia Vega today in house. This delightful 51-year-old  and father presents status post fall from a roof. Patient denies recollection of the exact mechanism of the fall however was witnessed to be associated with a loss of consciousness for about 10 minutes. Patient denies any numbness weakness or tingling. He has mild headache. There have been no seizures. CT scan of the chest was performed yesterday suspicious for thoracic fracture which we were consulted. No past medical history on file. No past surgical history on file. No family history on file. Social History     Socioeconomic History    Marital status: Single     Spouse name: Not on file    Number of children: Not on file    Years of education: Not on file    Highest education level: Not on file   Occupational History    Not on file   Tobacco Use    Smoking status: Never Smoker    Smokeless tobacco: Never Used   Substance and Sexual Activity    Alcohol use: Never    Drug use: Never    Sexual activity: Not on file   Other Topics Concern    Not on file   Social History Narrative    Not on file     Social Determinants of Health     Financial Resource Strain:     Difficulty of Paying Living Expenses:    Food Insecurity:     Worried About Running Out of Food in the Last Year:     [address] in the Last Year:    Transportation Needs:     Lack of Transportation (Medical):      Lack of Transportation (Non-Medical):    Physical Activity:     Days of Exercise per Week:     Minutes of Exercise per Session:    Stress:     Feeling of Stress :    Social Connections:     Frequency of Communication with Friends and Family:     Frequency of Social Gatherings with Friends and Family:     Attends [employer] Services:     Active Member of Clubs or Organizations:     Attends Club or Organization Meetings:     Marital Status:    Intimate Partner Violence:     Fear of Current or Ex-Partner:     Emotionally Abused:     Physically Abused:     Sexually Abused:        Medications:   Current Facility-Administered Medications   Medication Dose Route Frequency Provider Last Rate Last Admin    sodium chloride flush 0.9 % injection 10 mL  10 mL IntraVENous 2 times per day Gianmarino C Gianfrate, DO        sodium chloride flush 0.9 % injection 10 mL  10 mL IntraVENous PRN Gianmarino C Gianfrate, DO        0.9 % sodium chloride infusion  25 mL IntraVENous PRN Gianmarino C Gianfrate, DO        ondansetron (ZOFRAN-ODT) disintegrating tablet 4 mg  4 mg Oral Q8H PRN Gianmarino C Gianfrate, DO        Or    ondansetron (ZOFRAN) injection 4 mg  4 mg IntraVENous Q6H PRN Gianmarino C Gianfrate, DO        polyethylene glycol (GLYCOLAX) packet 17 g  17 g Oral Daily Gianmarino C Gianfrate, DO        acetaminophen (TYLENOL) tablet 650 mg  650 mg Oral 4 times per day Gianmarino C Gianfrate, DO   650 mg at 09/25/21 1329    oxyCODONE (ROXICODONE) immediate release tablet 5 mg  5 mg Oral Q4H PRN Gianmarino C Gianfrate, DO   5 mg at 09/25/21 1329    Or    oxyCODONE HCl (OXY-IR) immediate release tablet 10 mg  10 mg Oral Q4H PRN Gianmarino C Gianfrate, DO   10 mg at 09/25/21 0028    methocarbamol (ROBAXIN) tablet 1,000 mg  1,000 mg Oral 4x Daily Gianmarino C Gianfrate, DO   1,000 mg at 09/25/21 1329    bisacodyl (DULCOLAX) EC tablet 5 mg  5 mg Oral Daily Gianmarino C Gianfrate, DO   5 mg at 09/25/21 0904    sennosides-docusate sodium (SENOKOT-S) 8.6-50 MG tablet 1 tablet  1 tablet Oral BID Gianmarino C Gianfrate, DO   1 tablet at 09/25/21 8677        Allergies:    Fish-derived products       Review of Systems:    Denies any chest pain, shortness of breath, headache, dyspnea, recent weight loss, fevers, chills or night sweats. Physical Examination:    /62   Pulse 52   Temp 97.8 °F (36.6 °C) (Temporal)   Resp 16   Ht 6' (1.829 m)   Wt 285 lb (129.3 kg)   SpO2 95%   BMI 38.65 kg/m²      On focused neurological examination, he  is awake alert oriented and rationally conversant. Speech is clear and fluent. Pupils are equal and reactive to light bilaterally, extraocular movements are intact, visual fields are full to confrontation. His  face is symmetric and grossly intact to fine touch. Uvula and tongue are both midline. Shoulder shrug is symmetric and strong. Motor examination reveals preserved power in the upper and lower extremities at 5 out of 5 throughout. Reflexes are symmetric and brisk. Plantar responses are downgoing. There is no clonus. Patient is intact to fine touch in all dermatomes throughout. ASSESSMENT:    I personally reviewed Shae Cheng's radiographic images, particularly CT scan of the chest dated 24 September 2021 which demonstrates a T7 chip fracture along the inferior aspect of the vertebral body but without involvement of the posterior elements or third column. [unfilled]    MEDICAL DECISION MAKING & PLAN:    Vertebral body fracture T7 with incidental L1 transverse process fracture. Given his rib fractures placing the patient in a brace may be uncomfortable. I would recommend a regular TLSO not a custom fit at first to see whether this benefits the patient. No surgical intervention is required at this time. Both patient and the daughter present with him today were updated as to the plan. Thank you so much for allowing us to participate in the care of this patient.     Electronically signed by Prakash Deleon MD on 9/25/2021 at 2:24 PM

## 2021-09-25 NOTE — PROGRESS NOTES
OCCUPATIONAL THERAPY    Date:2021  Patient Name: Sirena Donald  MRN: [id number]  : 1955  Room: [address] [place] [address]A              Chart reviewed. Awaiting neurosurgery consult & recommendations regarding T7 fx prior to therapy session. Will re-attempt at later time. Thank you for consult.     Jordy Sellers, OTR/L [id number]

## 2021-09-25 NOTE — ED NOTES
Returned from [id number] [place] [address],[facility] 200, 8[address] [facility]  09/24/21 6457

## 2021-09-26 LAB
ANION GAP SERPL CALCULATED.3IONS-SCNC: 10 MMOL/L (ref 7–16)
BUN BLDV-MCNC: 18 MG/DL (ref 6–23)
CALCIUM SERPL-MCNC: 8.7 MG/DL (ref 8.6–10.2)
CHLORIDE BLD-SCNC: 102 MMOL/L (ref 98–107)
CO2: 27 MMOL/L (ref 22–29)
CREAT SERPL-MCNC: 1.1 MG/DL (ref 0.7–1.2)
GFR AFRICAN AMERICAN: >60
GFR NON-AFRICAN AMERICAN: >60 ML/MIN/1.73
GLUCOSE BLD-MCNC: 102 MG/DL (ref 74–99)
HCT VFR BLD CALC: 41.7 % (ref 37–54)
HEMOGLOBIN: 13.7 G/DL (ref 12.5–16.5)
MCH RBC QN AUTO: 31 PG (ref 26–35)
MCHC RBC AUTO-ENTMCNC: 32.9 % (ref 32–34.5)
MCV RBC AUTO: 94.3 FL (ref 80–99.9)
PDW BLD-RTO: 13.2 FL (ref 11.5–15)
PLATELET # BLD: 149 E9/L (ref 130–450)
PMV BLD AUTO: 10.4 FL (ref 7–12)
POTASSIUM SERPL-SCNC: 4.6 MMOL/L (ref 3.5–5)
RBC # BLD: 4.42 E12/L (ref 3.8–5.8)
SODIUM BLD-SCNC: 139 MMOL/L (ref 132–146)
WBC # BLD: 6.9 E9/L (ref 4.5–11.5)

## 2021-09-26 PROCEDURE — 6360000002 HC RX W HCPCS: Performed by: SURGERY

## 2021-09-26 PROCEDURE — 36415 COLL VENOUS BLD VENIPUNCTURE: CPT

## 2021-09-26 PROCEDURE — 97535 SELF CARE MNGMENT TRAINING: CPT

## 2021-09-26 PROCEDURE — 97161 PT EVAL LOW COMPLEX 20 MIN: CPT

## 2021-09-26 PROCEDURE — 80048 BASIC METABOLIC PNL TOTAL CA: CPT

## 2021-09-26 PROCEDURE — 6370000000 HC RX 637 (ALT 250 FOR IP): Performed by: STUDENT IN AN ORGANIZED HEALTH CARE EDUCATION/TRAINING PROGRAM

## 2021-09-26 PROCEDURE — 2580000003 HC RX 258: Performed by: STUDENT IN AN ORGANIZED HEALTH CARE EDUCATION/TRAINING PROGRAM

## 2021-09-26 PROCEDURE — 99232 SBSQ HOSP IP/OBS MODERATE 35: CPT | Performed by: SURGERY

## 2021-09-26 PROCEDURE — 99232 SBSQ HOSP IP/OBS MODERATE 35: CPT | Performed by: NEUROLOGICAL SURGERY

## 2021-09-26 PROCEDURE — 1200000000 HC SEMI PRIVATE

## 2021-09-26 PROCEDURE — 97530 THERAPEUTIC ACTIVITIES: CPT

## 2021-09-26 PROCEDURE — 97165 OT EVAL LOW COMPLEX 30 MIN: CPT

## 2021-09-26 PROCEDURE — 85027 COMPLETE CBC AUTOMATED: CPT

## 2021-09-26 RX ADMIN — DOCUSATE SODIUM 50 MG AND SENNOSIDES 8.6 MG 1 TABLET: 8.6; 5 TABLET, FILM COATED ORAL at 08:54

## 2021-09-26 RX ADMIN — DOCUSATE SODIUM 50 MG AND SENNOSIDES 8.6 MG 1 TABLET: 8.6; 5 TABLET, FILM COATED ORAL at 21:42

## 2021-09-26 RX ADMIN — METHOCARBAMOL TABLETS 1000 MG: 500 TABLET, COATED ORAL at 17:30

## 2021-09-26 RX ADMIN — ACETAMINOPHEN 650 MG: 325 TABLET ORAL at 17:29

## 2021-09-26 RX ADMIN — METHOCARBAMOL TABLETS 1000 MG: 500 TABLET, COATED ORAL at 12:56

## 2021-09-26 RX ADMIN — ENOXAPARIN SODIUM 40 MG: 40 INJECTION SUBCUTANEOUS at 21:42

## 2021-09-26 RX ADMIN — OXYCODONE HYDROCHLORIDE 10 MG: 10 TABLET ORAL at 06:42

## 2021-09-26 RX ADMIN — ENOXAPARIN SODIUM 30 MG: 30 INJECTION SUBCUTANEOUS at 08:56

## 2021-09-26 RX ADMIN — OXYCODONE HYDROCHLORIDE 10 MG: 10 TABLET ORAL at 02:40

## 2021-09-26 RX ADMIN — BISACODYL 5 MG: 5 TABLET, COATED ORAL at 08:55

## 2021-09-26 RX ADMIN — ACETAMINOPHEN 650 MG: 325 TABLET ORAL at 05:37

## 2021-09-26 RX ADMIN — Medication 10 ML: at 08:56

## 2021-09-26 RX ADMIN — Medication 10 ML: at 21:42

## 2021-09-26 RX ADMIN — METHOCARBAMOL TABLETS 1000 MG: 500 TABLET, COATED ORAL at 08:53

## 2021-09-26 ASSESSMENT — PAIN DESCRIPTION - PAIN TYPE: TYPE: ACUTE PAIN

## 2021-09-26 ASSESSMENT — PAIN DESCRIPTION - LOCATION: LOCATION: BACK;RIB CAGE

## 2021-09-26 ASSESSMENT — PAIN SCALES - GENERAL
PAINLEVEL_OUTOF10: 6
PAINLEVEL_OUTOF10: 5
PAINLEVEL_OUTOF10: 7
PAINLEVEL_OUTOF10: 0
PAINLEVEL_OUTOF10: 4
PAINLEVEL_OUTOF10: 0
PAINLEVEL_OUTOF10: 8

## 2021-09-26 ASSESSMENT — PAIN DESCRIPTION - PROGRESSION: CLINICAL_PROGRESSION: NOT CHANGED

## 2021-09-26 ASSESSMENT — ENCOUNTER SYMPTOMS
NAUSEA: 0
ABDOMINAL PAIN: 1
VOMITING: 0
BLOOD IN STOOL: 0
ABDOMINAL DISTENTION: 0
CONSTIPATION: 0
ALLERGIC/IMMUNOLOGIC NEGATIVE: 1
EYES NEGATIVE: 1
BACK PAIN: 1
DIARRHEA: 0
RESPIRATORY NEGATIVE: 1
SHORTNESS OF BREATH: 0
ANAL BLEEDING: 0
COUGH: 0

## 2021-09-26 ASSESSMENT — PAIN DESCRIPTION - DESCRIPTORS: DESCRIPTORS: ACHING;CONSTANT;SORE;TENDER

## 2021-09-26 ASSESSMENT — PAIN DESCRIPTION - ONSET: ONSET: ON-GOING

## 2021-09-26 ASSESSMENT — PAIN DESCRIPTION - FREQUENCY: FREQUENCY: CONTINUOUS

## 2021-09-26 NOTE — PROGRESS NOTES
[facility]  PROGRESS NOTE  ATTENDING NOTE        TRAUMA  MECHANISM:  Fall 12'    Chief Complaint   Patient presents with    Fall     from 12 ft ladder to grass. Son was present but did not witness. +LOC, - thinners. Also c/o chest discomfort. C collared on arrival       Fall  Associated symptoms include abdominal pain. Pertinent negatives include no headaches, nausea or vomiting. The patient is a 78 y/o male who sustained a fall 12' at approximately 1700. The patient reported  acute, constant  sharp pain localized to the back that started immediately. The intensity of the pain is 6/10. Pain does not radiate. There are no alleviating or worsening factors regarding the pain. The patient was transported by EMS to the [address] [facility] from scene. Evaluation prior to arrival included: H&P, CT head, c-spine, c/a/p. Treatment prior to arrival included: analgesia. A trauma consult was requested to assist, guide,  and expedite further evaluation and treatment for the patient. Patient Active Problem List   Diagnosis    Fall (on) (from) unspecified stairs and steps, initial encounter    Kidney laceration, right    Multiple closed fractures of ribs of both sides    Closed T7 fracture (HCC)       SUBJECTIVE/OVERNIGHT EVENTS:  No new issues    Review of Systems   Constitutional: Positive for activity change. Negative for appetite change and unexpected weight change. HENT: Negative. Eyes: Negative. Respiratory: Negative. Negative for cough and shortness of breath. Cardiovascular: Negative. Negative for chest pain and leg swelling. Gastrointestinal: Positive for abdominal pain. Negative for abdominal distention, anal bleeding, blood in stool, constipation, diarrhea, nausea and vomiting. Endocrine: Negative. Genitourinary: Negative. Musculoskeletal: Positive for back pain, gait problem and myalgias.  Negative for arthralgias and joint swelling. Skin: Negative. Allergic/Immunologic: Negative. Neurological: Negative for dizziness, weakness and headaches. Hematological: Negative. Psychiatric/Behavioral: Negative. Negative for confusion, decreased concentration and sleep disturbance. BP (!) 151/77   Pulse 62   Temp 98 °F (36.7 °C) (Temporal)   Resp 16   Ht 6' (1.829 m)   Wt 285 lb (129.3 kg)   SpO2 95%   BMI 38.65 kg/m²   Physical Exam  Constitutional:       Appearance: Normal appearance. He is obese. HENT:      Head: Normocephalic and atraumatic. Nose: Nose normal.      Mouth/Throat:      Mouth: Mucous membranes are moist.      Pharynx: Oropharynx is clear. Eyes:      Extraocular Movements: Extraocular movements intact. Pupils: Pupils are equal, round, and reactive to light. Cardiovascular:      Rate and Rhythm: Normal rate and regular rhythm. Pulses: Normal pulses. Heart sounds: Normal heart sounds. Pulmonary:      Effort: Pulmonary effort is normal.      Breath sounds: Normal breath sounds. [facility]: [facility]  Abdominal:      General: There is no distension. Palpations: Abdomen is soft. Tenderness: There is abdominal tenderness. Musculoskeletal:         General: Tenderness and signs of injury present. Cervical back: Normal range of motion and neck supple. Skin:     General: Skin is warm and dry. Neurological:      General: No focal deficit present. Mental Status: He is alert and oriented to person, place, and time. Psychiatric:         Mood and Affect: Mood normal.         Behavior: Behavior normal.         Thought Content: Thought content normal.         Judgment: Judgment normal.         ASSESSMENT/PLAN:  1. G4 Right Kidney laceration + cyst--monitor H/H, monitor UOP  2. Bilateral rib fractures--analgesia, pulmonary toilet  3. Small hemothorax--f/u CXR 9/26  4. T7 compression fracture--NSGY c/s  5. L1 tp fracture--analgesia.    6.  Morbid obesity, BM 38--diet, exercise education    INCIDENTAL FINDINGS:  none    [facility]:  15/24    DVT/GI ppx:  Bilateral SCDs, lovenox/diet    Family at bedside. Patient wants to go home. I advised that he did okay with physical therapy but could do better. I stated that if he were to physical therapy again tomorrow he likely will improve and then possibly home tomorrow or Tuesday.     Gonzalo Perez MD, MSc, FACS  9/26/2021  6:59 PM

## 2021-09-26 NOTE — PROGRESS NOTES
[address]       Date:2021                                                  Patient Name: Jed Loya    MRN: [id number]    : 1955    Room: [address] [place] [address]      Evaluating OT: Ashley Henderson OTR/L License #  [id number]       Referring Provider: Lucero Martinez DO    Specific Provider Orders/Date: OT evaluation & treatment       Diagnosis: Fall/ Trauma  Concussion, T7 L1 TP fxs., R 7,8,11,12 rib fxs., L 6,7 rib fxs., hemothorax, Kidney lac. Surgery: No past surgical history on file. Pertinent Medical History:    No past medical history on file. Precautions:  Fall Risk, TLSO, neutral spine     Assessment of current deficits    [] Functional mobility  [x]ADLs  [x] Strength               [x]Cognition    [x] Functional transfers   [x] IADLs         [x] Safety Awareness   [x]Endurance    [x] Fine Coordination              [x] Balance      [] Vision/perception   [x]Sensation     []Gross Motor Coordination  [] ROM  [] Delirium                   [] Motor Control     OT PLAN OF CARE   OT POC based on physician orders, patient diagnosis and results of clinical assessment    Frequency/Duration: 2-4 days/wk for 2 weeks PRN   Specific OT Treatment Interventions to include:    Instruction/training on adapted ADL techniques and AE recommendations to increase functional independence within precautions  Training on energy conservation strategies, correct breathing pattern and techniques to improve independence/tolerance for self-care routine  Functional transfer/mobility training/DME recommendations for increased independence, safety, and fall prevention  Patient/Family education to increase follow through with safety techniques and functional independence  Recommendation of environmental modifications for increased safety with functional transfers/mobility and ADLs  Therapeutic exercise to improve motor endurance, ROM, and functional strength for ADLs/functional transfers  Therapeutic activities to facilitate/challenge dynamic balance, stand tolerance for increased safety and independence with ADLs  Therapeutic activities to facilitate gross/fine motor skills for increased independence with ADLs  Positioning to improve skin integrity, interaction with environment and functional independence    Recommended Adaptive Equipment:  TBD for A.E., A.D. per PT    Home Living: Pt lives nghia in a 1 story with no steps to enter with no HR. B&B on main level. Bathroom setup: walk in shower, higher commode seat   Equipment owned: shower bench    Prior Level of Function: Ind. with ADLs , Ind. with IADLs; ambulated no A.D. Driving: active  Occupation: construction    Pain Level: 6/10 R rib pain; RN notified  Cognition: A&O: 4/4; Follows 2 step directions   Memory:  good   Sequencing:  good   Problem solving:  good   Judgement/safety:  Fair/+ with occasional cue for technique/ neutral spine     Functional Assessment:  AM-PAC Daily Activity Raw Score: 15/24   Initial Eval Status  Date: 9-26-21 Treatment Status  Date: STGs = LTGs  Time frame: 10-14 days   Feeding Independent      Grooming Set up seated  Modified Dare    UB Dressing Max A with TLSO adjustments, pt. instructed RE: fit & donning/ doffing technique, however pt. reports slept in it previous night. Cautioned pt. against sleeping in brace to prevent skin breakdown. Modified Dare    LB Dressing Mod A to thread pants over B feet seated  Modified Dare    Bathing Mod A with sim.  task  Modified Dare    Toileting NT  Modified Dare    Bed Mobility  Supine to sit: NT  Sit to supine: NT  Supine to sit: Modified Dare   Sit to supine: Modified Dare    Functional Transfers Min A with sit <> stand  CGA with SPT using ww  Modified Dare    Functional Mobility CGA with ww ~household distances  Modified [place]    Balance Sitting:     Static:  Ind. Dynamic:Sup  Standing:  CGA     Activity Tolerance Fair+ with lt/mod. Ax.  Good with mod. Ax. Visual/  Perceptual Glasses: [employer] [facility]  WFL     Hand Dominance R   AROM (PROM) Strength Additional Info:    RUE  WFL Grossly 4/5 good  and wfl FMC/dexterity noted during ADL tasks     LUE WFL Grossly 4/5 good  and wfl FMC/dexterity noted during ADL tasks       Hearing: WFL   Sensation:  No c/o numbness or tingling B UE  Tone: WFL   Edema: none noted B UE    Comments: Upon arrival patient seated EOB with PT, agreeable to OT, cleared by Nursing  Therapist facilitated seated ADLs/functional transfer/mobility training with focus on safety, technique & precautions. Pt. Instructed RE: safe transfers/mobility, ADLs, role of OT, treatment plan, recs. , prec. At end of session, patient seated in bedside chair, all needs met, RN notified, with call light and phone within reach, all lines and tubes intact. Overall patient demonstrated decreased strength, balance, independence & safety during completion of ADL/functional transfer/mobility tasks. Pt would benefit from continued skilled OT to increase safety and independence with completion of ADL/IADL tasks for functional independence and quality of life.     Treatment: OT treatment provided this date includes:    Instruction/training on safety and adapted techniques for completion of ADLs: to increase [place] in self care    Instruction/training on safe functional mobility/transfer techniques: with focus on safety, technique & precautions    Instruction/training on energy conservation/work simplification for completion of ADLs: techniques to increase [place] with self care ADLs & iADLs, work simplification to improve endurance    Proper Positioning/Alignment: for optimal healing, skin integrity to prevent breakdown, decrease edema   Skilled monitoring of vitals: to include BP, spO2 & HR during session   Sitting/standing Balance/Tolerance- to increased balance & activity tolerance during ADLs as well as facilitate proper posture and/or positioning. Rehab Potential: Good for established goals     Patient / Family Goal: to return home      Patient and/or family were instructed on functional diagnosis, prognosis/goals and OT plan of care. Demonstrated fair understanding. Eval Complexity: Low    Time In: 7:30  Time Out: 7:54  Total Treatment Time: 09    Min Units   OT Eval Low 69182  x     OT Eval Medium 27860      OT Eval High 61807      OT Re-Eval [id number]       Therapeutic Ex [id number]       Therapeutic Activities [id number]       ADL/Self Care 75275  09  1   Orthotic Management 00372       Manual [id number]     Neuro Re-Ed 47011       Non-Billable Time          Evaluation Time additionally includes thorough review of current medical information, gathering information on past medical history/social history and prior level of function, interpretation of standardized testing/informal observation of tasks, assessment of data and development of plan of care and goals. Sapphire Henderson, OTR/L   License #  [id number]

## 2021-09-26 NOTE — PROGRESS NOTES
Department of Neurosurgery  Progress Note    CHIEF COMPLAINT: T VB fx    SUBJECTIVE:  No ill events ON. Pain improved. REVIEW OF SYSTEMS :  Constitutional: Negative for chills and fever. Neurological: Negative for dizziness, tremors and speech change. OBJECTIVE:   VITALS:  BP (!) 151/77   Pulse 62   Temp 98 °F (36.7 °C) (Temporal)   Resp 16   Ht 6' (1.829 m)   Wt 285 lb (129.3 kg)   SpO2 95%   BMI 38.65 kg/m²   PHYSICAL  Remains intact with respect to power.     DATA:  CBC:   Lab Results   Component Value Date    WBC 6.9 09/26/2021    RBC 4.42 09/26/2021    HGB 13.7 09/26/2021    HCT 41.7 09/26/2021    MCV 94.3 09/26/2021    MCH 31.0 09/26/2021    MCHC 32.9 09/26/2021    RDW 13.2 09/26/2021     09/26/2021    MPV 10.4 09/26/2021     BMP:    Lab Results   Component Value Date     09/26/2021    K 4.6 09/26/2021    K 3.9 09/25/2021     09/26/2021    CO2 27 09/26/2021    BUN 18 09/26/2021    LABALBU 4.5 09/24/2021    CREATININE 1.1 09/26/2021    CALCIUM 8.7 09/26/2021    GFRAA >60 09/26/2021    LABGLOM >60 09/26/2021    GLUCOSE 102 09/26/2021     PT/INR:  No results found for: PROTIME, INR  PTT:  No results found for: APTT, PTT[APTT}    Current Inpatient Medications  Current Facility-Administered Medications: enoxaparin (LOVENOX) injection 40 mg, 40 mg, SubCUTAneous, BID  sodium chloride flush 0.9 % injection 10 mL, 10 mL, IntraVENous, 2 times per day  sodium chloride flush 0.9 % injection 10 mL, 10 mL, IntraVENous, PRN  0.9 % sodium chloride infusion, 25 mL, IntraVENous, PRN  ondansetron (ZOFRAN-ODT) disintegrating tablet 4 mg, 4 mg, Oral, Q8H PRN **OR** ondansetron (ZOFRAN) injection 4 mg, 4 mg, IntraVENous, Q6H PRN  polyethylene glycol (GLYCOLAX) packet 17 g, 17 g, Oral, Daily  acetaminophen (TYLENOL) tablet 650 mg, 650 mg, Oral, 4 times per day  oxyCODONE (ROXICODONE) immediate release tablet 5 mg, 5 mg, Oral, Q4H PRN **OR** oxyCODONE HCl (OXY-IR) immediate release tablet 10 mg, 10 mg, Oral, Q4H PRN  methocarbamol (ROBAXIN) tablet 1,000 mg, 1,000 mg, Oral, 4x Daily  bisacodyl (DULCOLAX) EC tablet 5 mg, 5 mg, Oral, Daily  sennosides-docusate sodium (SENOKOT-S) 8.6-50 MG tablet 1 tablet, 1 tablet, Oral, BID    ASSESSMENT:   · Brace at bedside      PLAN:  · Needs xrays upright with brace. · Continue current care.     Electronically signed by Prakash Deleon MD on 9/26/2021 at 8:42 PM

## 2021-09-26 NOTE — PROGRESS NOTES
Tried to get the patient up to the bedside commode. Though, the patient was to dizzy sitting on the side of the bed to move forward.

## 2021-09-26 NOTE — PROGRESS NOTES
Physical Therapy    Physical Therapy Initial Assessment     Name: Tracy Cox  : 1955  MRN: [id number]      Date of Service: 2021    Evaluating PT: Jesse Malave, PT, DPT [id number]      Room #:  5729/1660-M  Diagnosis:  Fall (on) (from) unspecified stairs and steps, initial encounter [W10. 9XXA]  Precautions:  Fall risk, T7 vertebral body fx, L1 transverse process fx, soft TLSO, B rib fxs, hemothorax    SUBJECTIVE:    Pt lives alone in a single story condo with 1 stair(s) and no rail(s) to enter. Bed is on the first floor and bath is on the first floor. Pt ambulated without AD prior to admission. OBJECTIVE:   Initial Evaluation  Date: 21 Treatment Date: Short Term/ Long Term   Goals   AM-PAC 6 Clicks 60/15     Was pt agreeable to Eval/treatment? Yes     Does pt have pain? 7-8/10 lower back pain     Bed Mobility  Rolling: Mod A  Supine to sit: Mod A  Sit to supine: NT  Scooting: SBA to EOB  Rolling: Independent   Supine to sit: Independent   Sit to supine: Independent   Scooting: Independent    Transfers Sit to stand: Min A  Stand to sit: Min A  Stand pivot: CGA with [employer]  Sit to stand: Independent   Stand to sit: Independent   Stand pivot: Independent vs Mod Independent with [employer]   Ambulation   50 feet with [employer] with CGA  200 feet Independent vs Mod Independent with [employer]   Stair negotiation: ascended and descended NT  1 platform step Independent vs Mod Independent with [employer]   ROM BUE: Refer to OT note  BLE: WFL     Strength BUE: Refer to OT note  BLE: NT     Balance Sitting EOB: Supervision  Dynamic Standing: CGA with [employer]  Sitting EOB: Independent   Dynamic Standing: Independent vs Mod Independent with WW     Pt is A & O x: 4 to person, place, month/year, and situation. Sensation: Pt denies numbness and tingling of extremities. Edema: Unremarkable. Patient education  Pt educated on spinal neutrality.     Patient response to education:   Pt verbalized understanding Pt demonstrated skill Pt requires further education in this area   Yes Yes Reinforce     ASSESSMENT:    Conditions Requiring Skilled Therapeutic Intervention:    [x]Decreased strength     []Decreased ROM  [x]Decreased functional mobility  [x]Decreased balance   []Decreased endurance   []Decreased posture  []Decreased sensation  []Decreased coordination   []Decreased vision  []Decreased safety awareness   [x]Increased pain       Comments:    Pt was in bed with TLSO donned upon room entry, agreeable to PT evaluation. Pt was educated on taking brace off when sleeping to prevent skin breakdown. Pt used log roll technique during supine to sit transfer and required assistance for trunk mobility. Pt reported dizziness upon sitting but symptoms improved with time. Pt ambulated short distance within room with WW. Gait was slow but steady with WW. Gait speed improved with time. No LOB noted. Pt reported pain improved with activity. Pt ambulated back to chair and was seated with good controlled descent. Pt was left in chair with all needs met at conclusion of session. Treatment:  Patient practiced and was instructed in the following treatment:     Therapeutic activities:  o Bed mobility: Pt was cued for log roll technique during supine to sit transfer. o Transfers: Pt was cued for hand placement during sit <> stand transfers. o Ambulation: Pt ambulated with [employer] as he was cued for posture and WW technique. o Education: Pt was educated on spinal neutrality and soft TLSO brace. Pt was instructed to take brace off when sleeping in bed to protect skin integrity. Pt's/family goals:  1. To return home. Prognosis is Good for reaching above PT goals. Patient and or family understand(s) diagnosis, prognosis, and plan of care. Yes.     PHYSICAL THERAPY PLAN OF CARE:    PT POC is established based on physician order and patient diagnosis     Referring provider/PT Order:    Start   Ordering Provider    09/25/21 0015  PT evaluation and treat Start: 09/25/21 6862, End: 09/25/21 0015, ONE TIME, Standing Count: 1 Occurrences, R      Jewel Kuo DO        Diagnosis:  Fall (on) (from) unspecified stairs and steps, initial encounter [W10. 9XXA]  Specific instructions for next treatment:  Attempt platform step. Current Treatment Recommendations:     [x] Strengthening to improve independence with functional mobility   [] ROM to improve independence with functional mobility   [x] Balance Training to improve static/dynamic balance and to reduce fall risk  [] Endurance Training to improve activity tolerance during functional mobility   [x] Transfer Training to improve safety and independence with all functional transfers   [x] Gait Training to improve gait mechanics, endurance and assess need for appropriate assistive device  [] Stair Training in preparation for safe discharge home and/or into the community   [x] Positioning to prevent skin breakdown and contractures  [x] Safety and Education Training   [] Patient/Caregiver Education   [] HEP  [] Other     PT long term treatment goals are located in above grid    Frequency of treatments: Daily x 3-5 days. Time in  0715  Time out  0751    Total Treatment Time  23 minutes     Evaluation Time includes thorough review of current medical information, gathering information on past medical history/social history and prior level of function, completion of standardized testing/informal observation of tasks, assessment of data and education on plan of care and goals.     CPT codes:  [x] Low Complexity PT evaluation 05842  [] Moderate Complexity PT evaluation 39729  [] High Complexity PT evaluation 03947  [] PT Re-evaluation 32919  [] Gait training 36490 0 minutes  [] Manual therapy 66713 0 minutes  [x] Therapeutic activities 47208 23 minutes  [] Therapeutic exercises 40673 0 minutes  [] Neuromuscular reeducation 66079 0 minutes     Jonnie Pedraza, PT, DPT  [id number]

## 2021-09-27 ENCOUNTER — APPOINTMENT (OUTPATIENT)
Dept: GENERAL RADIOLOGY | Age: 66
DRG: 964 | End: 2021-09-27
Payer: MEDICARE

## 2021-09-27 LAB
ANION GAP SERPL CALCULATED.3IONS-SCNC: 8 MMOL/L (ref 7–16)
BUN BLDV-MCNC: 13 MG/DL (ref 6–23)
CALCIUM SERPL-MCNC: 8.7 MG/DL (ref 8.6–10.2)
CHLORIDE BLD-SCNC: 103 MMOL/L (ref 98–107)
CO2: 27 MMOL/L (ref 22–29)
CREAT SERPL-MCNC: 0.9 MG/DL (ref 0.7–1.2)
GFR AFRICAN AMERICAN: >60
GFR NON-AFRICAN AMERICAN: >60 ML/MIN/1.73
GLUCOSE BLD-MCNC: 102 MG/DL (ref 74–99)
HCT VFR BLD CALC: 41.2 % (ref 37–54)
HEMOGLOBIN: 13.3 G/DL (ref 12.5–16.5)
MCH RBC QN AUTO: 30.8 PG (ref 26–35)
MCHC RBC AUTO-ENTMCNC: 32.3 % (ref 32–34.5)
MCV RBC AUTO: 95.4 FL (ref 80–99.9)
PDW BLD-RTO: 13.1 FL (ref 11.5–15)
PLATELET # BLD: 147 E9/L (ref 130–450)
PMV BLD AUTO: 10.3 FL (ref 7–12)
POTASSIUM SERPL-SCNC: 4.3 MMOL/L (ref 3.5–5)
RBC # BLD: 4.32 E12/L (ref 3.8–5.8)
SODIUM BLD-SCNC: 138 MMOL/L (ref 132–146)
WBC # BLD: 5.6 E9/L (ref 4.5–11.5)

## 2021-09-27 PROCEDURE — 97535 SELF CARE MNGMENT TRAINING: CPT

## 2021-09-27 PROCEDURE — 85027 COMPLETE CBC AUTOMATED: CPT

## 2021-09-27 PROCEDURE — 97530 THERAPEUTIC ACTIVITIES: CPT

## 2021-09-27 PROCEDURE — 71045 X-RAY EXAM CHEST 1 VIEW: CPT

## 2021-09-27 PROCEDURE — 2580000003 HC RX 258: Performed by: STUDENT IN AN ORGANIZED HEALTH CARE EDUCATION/TRAINING PROGRAM

## 2021-09-27 PROCEDURE — 99232 SBSQ HOSP IP/OBS MODERATE 35: CPT | Performed by: NEUROLOGICAL SURGERY

## 2021-09-27 PROCEDURE — 36415 COLL VENOUS BLD VENIPUNCTURE: CPT

## 2021-09-27 PROCEDURE — 6360000002 HC RX W HCPCS: Performed by: SURGERY

## 2021-09-27 PROCEDURE — L0464 TLSO 4MOD SACRO-SCAP PRE: HCPCS

## 2021-09-27 PROCEDURE — 99232 SBSQ HOSP IP/OBS MODERATE 35: CPT | Performed by: SURGERY

## 2021-09-27 PROCEDURE — 6370000000 HC RX 637 (ALT 250 FOR IP): Performed by: STUDENT IN AN ORGANIZED HEALTH CARE EDUCATION/TRAINING PROGRAM

## 2021-09-27 PROCEDURE — 1200000000 HC SEMI PRIVATE

## 2021-09-27 PROCEDURE — 80048 BASIC METABOLIC PNL TOTAL CA: CPT

## 2021-09-27 RX ADMIN — DOCUSATE SODIUM 50 MG AND SENNOSIDES 8.6 MG 1 TABLET: 8.6; 5 TABLET, FILM COATED ORAL at 09:27

## 2021-09-27 RX ADMIN — DOCUSATE SODIUM 50 MG AND SENNOSIDES 8.6 MG 1 TABLET: 8.6; 5 TABLET, FILM COATED ORAL at 21:02

## 2021-09-27 RX ADMIN — METHOCARBAMOL TABLETS 1000 MG: 500 TABLET, COATED ORAL at 12:13

## 2021-09-27 RX ADMIN — BISACODYL 5 MG: 5 TABLET, COATED ORAL at 09:27

## 2021-09-27 RX ADMIN — Medication 10 ML: at 11:08

## 2021-09-27 RX ADMIN — ACETAMINOPHEN 650 MG: 325 TABLET ORAL at 18:17

## 2021-09-27 RX ADMIN — Medication 10 ML: at 21:02

## 2021-09-27 RX ADMIN — ACETAMINOPHEN 650 MG: 325 TABLET ORAL at 12:13

## 2021-09-27 RX ADMIN — METHOCARBAMOL TABLETS 1000 MG: 500 TABLET, COATED ORAL at 18:17

## 2021-09-27 RX ADMIN — METHOCARBAMOL TABLETS 1000 MG: 500 TABLET, COATED ORAL at 21:02

## 2021-09-27 RX ADMIN — POLYETHYLENE GLYCOL 3350 17 G: 17 POWDER, FOR SOLUTION ORAL at 09:27

## 2021-09-27 RX ADMIN — ENOXAPARIN SODIUM 40 MG: 40 INJECTION SUBCUTANEOUS at 21:02

## 2021-09-27 RX ADMIN — METHOCARBAMOL TABLETS 1000 MG: 500 TABLET, COATED ORAL at 01:08

## 2021-09-27 RX ADMIN — ENOXAPARIN SODIUM 40 MG: 40 INJECTION SUBCUTANEOUS at 09:27

## 2021-09-27 RX ADMIN — METHOCARBAMOL TABLETS 1000 MG: 500 TABLET, COATED ORAL at 09:27

## 2021-09-27 RX ADMIN — OXYCODONE HYDROCHLORIDE 10 MG: 10 TABLET ORAL at 01:07

## 2021-09-27 ASSESSMENT — PAIN SCALES - GENERAL
PAINLEVEL_OUTOF10: 3
PAINLEVEL_OUTOF10: 7
PAINLEVEL_OUTOF10: 5
PAINLEVEL_OUTOF10: 0

## 2021-09-27 NOTE — PROGRESS NOTES
Department of Neurosurgery  Progress Note    CHIEF COMPLAINT: T7 Fx & L1 Transverse fracture s/p fall    SUBJECTIVE:  No acute events overnight. Pain is well controlled. Patient denies any new numbness or weakness, as well as denies any bowel or bladder incontinence. REVIEW OF SYSTEMS :  Constitutional: Negative for chills and fever. Neurological: Negative for dizziness, tremors and speech change.      OBJECTIVE:   VITALS:  BP (!) 140/71   Pulse 60   Temp 96.8 °F (36 °C) (Temporal)   Resp 18   Ht 6' (1.829 m)   Wt 285 lb (129.3 kg)   SpO2 98%   BMI 38.65 kg/m²     PHYSICAL  Alert, oriented  Appears stated age  PERRL  EOMI  Strength full  Sensation intact to light touch  Dressing c/d/i    DATA:  CBC:   Lab Results   Component Value Date    WBC 5.6 09/27/2021    RBC 4.32 09/27/2021    HGB 13.3 09/27/2021    HCT 41.2 09/27/2021    MCV 95.4 09/27/2021    MCH 30.8 09/27/2021    MCHC 32.3 09/27/2021    RDW 13.1 09/27/2021     09/27/2021    MPV 10.3 09/27/2021     BMP:    Lab Results   Component Value Date     09/27/2021    K 4.3 09/27/2021    K 3.9 09/25/2021     09/27/2021    CO2 27 09/27/2021    BUN 13 09/27/2021    LABALBU 4.5 09/24/2021    CREATININE 0.9 09/27/2021    CALCIUM 8.7 09/27/2021    GFRAA >60 09/27/2021    LABGLOM >60 09/27/2021    GLUCOSE 102 09/27/2021     PT/INR:  No results found for: PROTIME, INR  PTT:  No results found for: APTT, PTT[APTT}    Current Inpatient Medications  Current Facility-Administered Medications: magnesium hydroxide (MILK OF MAGNESIA) 400 MG/5ML suspension 30 mL, 30 mL, Oral, Daily PRN  enoxaparin (LOVENOX) injection 40 mg, 40 mg, SubCUTAneous, BID  sodium chloride flush 0.9 % injection 10 mL, 10 mL, IntraVENous, 2 times per day  sodium chloride flush 0.9 % injection 10 mL, 10 mL, IntraVENous, PRN  0.9 % sodium chloride infusion, 25 mL, IntraVENous, PRN  ondansetron (ZOFRAN-ODT) disintegrating tablet 4 mg, 4 mg, Oral, Q8H PRN **OR** ondansetron [facility]) injection 4 mg, 4 mg, IntraVENous, Q6H PRN  polyethylene glycol (GLYCOLAX) packet 17 g, 17 g, Oral, Daily  acetaminophen (TYLENOL) tablet 650 mg, 650 mg, Oral, 4 times per day  oxyCODONE (ROXICODONE) immediate release tablet 5 mg, 5 mg, Oral, Q4H PRN **OR** oxyCODONE HCl (OXY-IR) immediate release tablet 10 mg, 10 mg, Oral, Q4H PRN  methocarbamol (ROBAXIN) tablet 1,000 mg, 1,000 mg, Oral, 4x Daily  bisacodyl (DULCOLAX) EC tablet 5 mg, 5 mg, Oral, Daily  sennosides-docusate sodium (SENOKOT-S) 8.6-50 MG tablet 1 tablet, 1 tablet, Oral, BID    ASSESSMENT:   -Porsha Mark is a 78 y/o male who presents with a T7 fracture and L1 transverse fx after a fall. Stable. PLAN:  - Continue pain control  - Continue to wear brace for 3 months  - Okay to d/c from our standpoint  - Follow up in clinic in 4 week with upright Xrays    Electronically signed by ABBEY Guo on 9/27/2021 at 10:43 AM     Nsx Attending:    Patient was seen and examined by me with the team.  I personally reviewed all pertinent radiological images. I concur with Miss Lazo's clinical assessment and plan. Neurologically intact for me. Brace and FU as above. Thank you so much for allowing us to participate in the care of this patient.

## 2021-09-27 NOTE — PROGRESS NOTES
Physical Therapy    Name: Prema Emanuel  : 1955  MRN: [id number]      Date of Service: 2021    Evaluating PT: Iwona Serna, PT, DPT [id number]      Room #:  8225/4669-R  Diagnosis:  Fall (on) (from) unspecified stairs and steps, initial encounter [W10. 9XXA]  Precautions:  Fall risk, T7 vertebral body fx, L1 transverse process fx, soft TLSO, B rib fxs, hemothorax    SUBJECTIVE:    Pt lives alone in a single story condo with 1 stair(s) and no rail(s) to enter. Bed is on the first floor and bath is on the first floor. Pt ambulated without AD prior to admission. OBJECTIVE:   Initial Evaluation  Date: 21 Treatment Date:  21 Short Term/ Long Term   Goals   AM-PAC 6 Clicks  95/43    Was pt agreeable to Eval/treatment? Yes yes    Does pt have pain? 7-8/10 lower back pain Yes 10    Bed Mobility  Rolling: Mod A  Supine to sit: Mod A  Sit to supine: NT  Scooting: SBA to EOB NT pm session was up in chair pre and post session. Rolling: Independent   Supine to sit: Independent   Sit to supine: Independent   Scooting: Independent    Transfers Sit to stand: Min A  Stand to sit: Min A  Stand pivot: CGA with [employer] Sit to stand: CGA for bedside chair. Stand to sit: CGA  Stand pivot: CGA with WW Sit to stand: Independent   Stand to sit: Independent   Stand pivot: Independent vs Mod Independent with [employer]   Ambulation   50 feet with WW with  feet with fww for support with SBA. 200 feet Independent vs Mod Independent with [employer]   Stair negotiation: ascended and descended NT NT 1 platform step Independent vs Mod Independent with [employer]   ROM BUE: Refer to OT note  BLE: WFL     Strength BUE: Refer to OT note  BLE: NT     Balance Sitting EOB: Supervision  Dynamic Standing: CGA with [employer]  Sitting EOB: Independent   Dynamic Standing: Independent vs Mod Independent with [employer]     Pt is A & O x: 4 to person, place, month/year, and situation. Sensation: Pt denies numbness and tingling of extremities.   Edema: Unremarkable. Patient education  Pt educated on spinal neutrality. Patient response to education:   Pt verbalized understanding Pt demonstrated skill Pt requires further education in this area   Yes Yes Reinforce     ASSESSMENT:    Conditions Requiring Skilled Therapeutic Intervention:    [x]Decreased strength     []Decreased ROM  [x]Decreased functional mobility  [x]Decreased balance   []Decreased endurance   []Decreased posture  []Decreased sensation  []Decreased coordination   []Decreased vision  []Decreased safety awareness   [x]Increased pain       Comments:    Pt was in bedside in chair finishing lunch. Reviewed spinal precautions with good understanding. Also reviewed application of TLSO and adjustment of the device. Required fww for gait into hallway with cues for sequencing. Required SBA once standing. No LOB noted. Pt reported pain improved with activity. Pt ambulated back to chair and was seated with good controlled descent. Pt was left in chair with all needs met at conclusion of session. Treatment:  Patient practiced and was instructed in the following treatment:     Therapeutic activities  o Transfers: Pt was cued for hand placement during sit <> stand transfers. o Ambulation: Pt ambulated with [employer] as he was cued for posture and WW technique. o Education: Pt was educated on spinal neutrality and soft TLSO brace. Pt was instructed to take brace off when sleeping in bed to protect skin integrity. Pt's/family goals:  1. To return home. Prognosis is Good for reaching above PT goals. Patient and or family understand(s) diagnosis, prognosis, and plan of care. Yes.     PHYSICAL THERAPY PLAN OF CARE:    PT POC is established based on physician order and patient diagnosis     Referring provider/PT Order:    Start   Ordering Provider    09/25/21 0015  PT evaluation and treat Start: 09/25/21 0015, End: 09/25/21 0015, ONE TIME, Standing Count: 1 Occurrences R      Jewel Kuo DO Diagnosis:  Fall (on) (from) unspecified stairs and steps, initial encounter [W10. 9XXA]  Specific instructions for next treatment:  Attempt platform step. Current Treatment Recommendations:     [x] Strengthening to improve independence with functional mobility   [] ROM to improve independence with functional mobility   [x] Balance Training to improve static/dynamic balance and to reduce fall risk  [] Endurance Training to improve activity tolerance during functional mobility   [x] Transfer Training to improve safety and independence with all functional transfers   [x] Gait Training to improve gait mechanics, endurance and assess need for appropriate assistive device  [] Stair Training in preparation for safe discharge home and/or into the community   [x] Positioning to prevent skin breakdown and contractures  [x] Safety and Education Training   [] Patient/Caregiver Education   [] HEP  [] Other     PT long term treatment goals are located in above grid    Frequency of treatments: Daily x 3-5 days. Time in  1310  Time out  1325    Total Treatment Time  15 minutes     Evaluation Time includes thorough review of current medical information, gathering information on past medical history/social history and prior level of function, completion of standardized testing/informal observation of tasks, assessment of data and education on plan of care and goals.     CPT codes:  [] Low Complexity PT evaluation 52882  [] Moderate Complexity PT evaluation 15965  [] High Complexity PT evaluation 66258  [] PT Re-evaluation 47244  [] Gait training 49495 0 minutes  [] Manual therapy 88500 0 minutes  [x] Therapeutic activities 31631 15 minutes  [] Therapeutic exercises 54791 0 minutes  [] Neuromuscular reeducation 66731 0 minutes     [facility] [address] [facility]

## 2021-09-27 NOTE — PLAN OF CARE
Problem: Falls - Risk of:  Goal: Will remain free from falls  Description: Will remain free from falls  Outcome: Met This Shift  Goal: Absence of physical injury  Description: Absence of physical injury  Outcome: Met This Shift     Problem: Skin Integrity:  Goal: Will show no infection signs and symptoms  Description: Will show no infection signs and symptoms  Outcome: Met This Shift     Problem: Pain:  Goal: Pain level will decrease  Description: Pain level will decrease  Outcome: Ongoing  Goal: Control of acute pain  Description: Control of acute pain  Outcome: Ongoing  Goal: Control of chronic pain  Description: Control of chronic pain  Outcome: Ongoing

## 2021-09-27 NOTE — PROGRESS NOTES
Occupational Therapy  OT BEDSIDE TREATMENT NOTE   [address] [facility] [place] [address]  [address] [place] [address]      Date:2021  Patient Name: Flora Schneider  MRN: [id number]  : 1955  Room: [address] [place] [address]     Evaluating OT: Anand Henderson OTR/L License #  [id number]        Referring Provider: Rebeca Aviles DO    Specific Provider Orders/Date: OT evaluation & treatment        Diagnosis: Fall/ Trauma  Concussion, T7 L1 TP fxs., R 7,8,11,12 rib fxs., L 6,7 rib fxs., hemothorax, Kidney lac. Surgery:   Past Surgical History   No past surgical history on file. Pertinent Medical History:      Past Medical History   No past medical history on file. Precautions:  Fall Risk, TLSO, neutral spine      Assessment of current deficits    []? Functional mobility            [x]?ADLs           [x]? Strength                  [x]? Cognition    [x]? Functional transfers          [x]? IADLs         [x]? Safety Awareness   [x]? Endurance    [x]? Fine Coordination                         [x]? Balance      []? Vision/perception   [x]? Sensation      []? Gross Motor Coordination             []? ROM           []?  Delirium                   []? Motor Control      OT PLAN OF CARE   OT POC based on physician orders, patient diagnosis and results of clinical assessment     Frequency/Duration: 2-4 days/wk for 2 weeks PRN   Specific OT Treatment Interventions to include:   Instruction/training on adapted ADL techniques and AE recommendations to increase functional independence within precautions  Training on energy conservation strategies, correct breathing pattern and techniques to improve independence/tolerance for self-care routine  Functional transfer/mobility training/DME recommendations for increased independence, safety, and fall prevention  Patient/Family education to increase follow through with safety techniques and functional independence  Recommendation of environmental modifications for increased safety with functional transfers/mobility and ADLs  Therapeutic exercise to improve motor endurance, ROM, and functional strength for ADLs/functional transfers  Therapeutic activities to facilitate/challenge dynamic balance, stand tolerance for increased safety and independence with ADLs  Therapeutic activities to facilitate gross/fine motor skills for increased independence with ADLs  Positioning to improve skin integrity, interaction with environment and functional independence     Recommended Adaptive Equipment:  TBD for A.E., A.D. per PT     Home Living: Pt lives alone in a 1 story with no steps to enter with no HR. B&B on main level. Bathroom setup: walk in shower, higher commode seat   Equipment owned: shower bench     Prior Level of Function: Ind. with ADLs , Ind. with IADLs; ambulated no A.D. Driving: active  Occupation: construction     Pain Level: 1/10 back pain at rest, 10/10 when rolling, Min once seated and standing  Cognition: A&O: 4/4; Follows 2 step directions              Memory:  good              Sequencing:  good              Problem solving:  good              Judgement/safety:  Fair/+ with occasional cue for technique/ neutral spine                Functional Assessment:  AM-PAC Daily Activity Raw Score: 16/24    Initial Eval Status  Date: 9-26-21 Treatment Status  Date: 9/27/21 STGs = LTGs  Time frame: 10-14 days   Feeding Independent   Ind     Grooming Set up seated SBA  To brush teeth standing at sink  Modified [place]    UB Dressing Max A with TLSO adjustments, pt. instructed RE: fit & donning/ doffing technique, however pt. reports slept in it previous night. Cautioned pt. against sleeping in brace to prevent skin breakdown. Max A  Min A to don/doff gown supine. Max A to don TLSO supine rolling side to side  Modified [place]    LB Dressing Mod A to thread pants over B feet seated  Min A  Pt educated on LB AE.  Donned/doffed socks using reacher and sock aid Modified [place]    Bathing Mod A with sim. task Mod A  Issued LHS  Modified [place]    Toileting NT N/T  Modified [place]    Bed Mobility  Supine to sit: NT  Sit to supine: NT Max A- supine>sit  Educated pt on technique to increase independence.    Supine to sit: Modified [place]   Sit to supine: Modified [place]    Functional Transfers Min A with sit <> stand  CGA with SPT using ww Min A- sit<->stand  Cuing for hand placement and body mechanics  Modified [place]    Functional Mobility CGA with ww ~household distances CGA  To and from bathroom using w/w  Modified [place]    Balance Sitting:     Static:  Ind. Dynamic:Sup  Standing:  CGA  Sitting:     Static:  Ind. Dynamic:Sup  Standing:  SBA     Activity Tolerance Fair+ with lt/mod. Ax.  Fair Good with mod. Ax. Visual/  Perceptual Glasses: readers          Vitals WFL   WFL       Comments: Upon arrival pt supine in bed. Educated pt on spinal precautions, demonstrates fair- understanding. Pt educated on adaptive techniques to increase independence and safety during ADL's, bed mobility, and functional transfers while maintaining precautions. Discussed home set up with pt, giving suggestions to increase safety at discharge. At end of session pt left seated in bedside chair, call light within reach. · Pt has made fair+ progress towards set goals.      · Continue with current plan of care    Treatment Time In: 11:20            Treatment Time Out: 11:50             Treatment Charges: Mins Units   Ther Ex  [id number]     Manual Therapy [phone]     Thera Activities 25910 15 1   ADL/Home Mgt 80912 15 1   Neuro Re-ed 03636     Group Therapy      Orthotic manage/training  13915     Non-Billable Time     Total Timed Treatment 30 2     Leonie Shaw

## 2021-09-27 NOTE — PROGRESS NOTES
Physical Therapy    Physical Therapy    Name: Blank Holloway  : 1955  MRN: [id number]      Date of Service: 2021    Evaluating PT: Lisa Lyons, PT, DPT [id number]      Room #:  9877/7737-L  Diagnosis:  Fall (on) (from) unspecified stairs and steps, initial encounter [W10. 9XXA]  Precautions:  Fall risk, T7 vertebral body fx, L1 transverse process fx, soft TLSO, B rib fxs, hemothorax    SUBJECTIVE:    Pt lives alone in a single story condo with 1 stair(s) and no rail(s) to enter. Bed is on the first floor and bath is on the first floor. Pt ambulated without AD prior to admission. OBJECTIVE:   Initial Evaluation  Date: 21 Treatment Date:  Short Term/ Long Term   Goals   AM-PAC 6 Clicks 53/93     Was pt agreeable to Eval/treatment? Yes yes    Does pt have pain? 7-8/10 lower back pain 8/10 \"spasms\" back    Bed Mobility  Rolling: Mod A  Supine to sit: Mod A  Sit to supine: NT  Scooting: SBA to EOB Rolling : ModA  Supine to sit ModA  scottong SBA Rolling: Independent   Supine to sit: Independent   Sit to supine: Independent   Scooting: Independent    Transfers Sit to stand: Min A  Stand to sit: Min A  Stand pivot: CGA with [employer] Sit to stand Arabella  Stand to sit Arabella  Stand pivot Arabella Sit to stand: Independent   Stand to sit: Independent   Stand pivot: Independent vs Mod Independent with [employer]   Ambulation   50 feet with WW with  feet x1 with ww  feet Independent vs Mod Independent with [employer]   Stair negotiation: ascended and descended NT N/T 1 platform step Independent vs Mod Independent with [employer]   ROM BUE: Refer to OT note  BLE: WFL     Strength BUE: Refer to OT note  BLE: NT     Balance Sitting EOB: Supervision  Dynamic Standing: CGA with [employer] Sitting EOB Supervision  Standing with ww CGA Sitting EOB: Independent   Dynamic Standing: Independent vs Mod Independent with [employer]     Pt is A & O x: 4 to person, place, month/year, and situation.   Sensation: Pt denies numbness and tingling of extremities. Edema: Unremarkable. Patient education  Pt educated on spinal neutrality. Patient response to education:   Pt verbalized understanding Pt demonstrated skill Pt requires further education in this area   Yes Yes Reinforce     ASSESSMENT:    Conditions Requiring Skilled Therapeutic Intervention:    [x]Decreased strength     []Decreased ROM  [x]Decreased functional mobility  [x]Decreased balance   []Decreased endurance   []Decreased posture  []Decreased sensation  []Decreased coordination   []Decreased vision  []Decreased safety awareness   [x]Increased pain       Comments:    Pt cleared for tx session. Pt found in bed and agreed to tx session. Reviewed precautions with pt. Reviewed of importance of donning and doffing soft TLSO in supine. Pt rolled L and R to ej soft TLSO . Pt c/o \"spasms\" with rolling. Pt required assistance for LEs and trunk during sidelying to sit. Pt sat EOB for about a minute. Pt required Arabella for transfers to ww. Pt amb with good technique with ww. Pt agreed to sit in chair. Reviewed platform step with pt. Increased time required due to pt c/o \"spasms. \"  Pt given call light. Treatment:  Patient practiced and was instructed in the following treatment:     Therapeutic activities:  o Bed mobility: Pt was cued for log roll technique during supine to sit transfer. o Transfers: Pt was cued for hand placement during sit <> stand transfers. o Ambulation: Pt ambulated with [employer] as he was cued for posture and WW technique. o Education: Pt was educated on spinal neutrality and soft TLSO brace. Pt was instructed to take brace off when sleeping in bed to protect skin integrity. Pt's/family goals:  1. To return home. Prognosis is Good for reaching above PT goals. Patient and or family understand(s) diagnosis, prognosis, and plan of care. Yes.     PHYSICAL THERAPY PLAN OF CARE:    PT POC is established based on physician order and patient diagnosis     Referring provider/PT Order:    Start   Ordering Provider    09/25/21 0015  PT evaluation and treat Start: 09/25/21 0015, End: 09/25/21 0015, ONE TIME, Standing Count: 1 Occurrences, R      Jewel Kuo DO        Diagnosis:  Fall (on) (from) unspecified stairs and steps, initial encounter [W10. 9XXA]  Specific instructions for next treatment:  Attempt platform step. Current Treatment Recommendations:     [x] Strengthening to improve independence with functional mobility   [] ROM to improve independence with functional mobility   [x] Balance Training to improve static/dynamic balance and to reduce fall risk  [] Endurance Training to improve activity tolerance during functional mobility   [x] Transfer Training to improve safety and independence with all functional transfers   [x] Gait Training to improve gait mechanics, endurance and assess need for appropriate assistive device  [] Stair Training in preparation for safe discharge home and/or into the community   [x] Positioning to prevent skin breakdown and contractures  [x] Safety and Education Training   [] Patient/Caregiver Education   [] HEP  [] Other     PT long term treatment goals are located in above grid    Frequency of treatments: Daily x 3-5 days.     Time in  11:18  Time out 11:41    Total Treatment Time  23 minutes         CPT codes:  [] Low Complexity PT evaluation 22880  [] Moderate Complexity PT evaluation 53087  [] High Complexity PT evaluation 11678  [] PT Re-evaluation 94942  [] Gait training 26710 0 minutes  [] Manual therapy 12507 0 minutes  [x] Therapeutic activities 00152 23 minutes  [] Therapeutic exercises 36208 0 minutes  [] Neuromuscular reeducation 96246 0 minutes   Senora Pro [id number]

## 2021-09-27 NOTE — PROGRESS NOTES
TRAUMA SURGERY  ATTENDING PROGRESS NOTE      CC: fall 12 feet    S:   doing ok pain controlled no BM yet, no cp sob,     O:   @BP (!) 140/71   Pulse 60   Temp 96.8 °F (36 °C) (Temporal)   Resp 18   Ht 6' (1.829 m)   Wt 285 lb (129.3 kg)   SpO2 98%   BMI 38.65 kg/m² @    Gen - no apparent distress   Neuro - Awake, alert, attentive    HEENT - PERRL 3,, conj pink   Lungs - non labored, BS clear b/l    Heart - RR no extra heart sounds    Abdomen - NAD, obese,   Ext- rom wnl NVI     A/P: s/p fall T7 fx, fib fx,       Active Problems:    Fall (on) (from) unspecified stairs and steps, initial encounter    Kidney laceration, right    Multiple closed fractures of ribs of both sides    Closed T7 fracture (HCC)  Resolved Problems:    * No resolved hospital problems. *      ASSESSMENT/PLAN:  -  G4 Right Kidney laceration + cyst--monitor H/H, monitor UOP  -  Bilateral rib fractures--analgesia, pulmonary toilet   -  Small hemothorax--f/u CXR ordered today   -  T7 compression fracture--NSGY TLSO non op   -  L1 tp fracture--analgesia.    -  Morbid obesity, BM 38--diet, exercise education  - PTOT d/c planning     DVT prophylaxis: Ihsan Morris MD FACS

## 2021-09-27 NOTE — CARE COORDINATION
Met with the pt at the bedside. 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 He has medicare. He lives alone. His daughter, who is a nurse, lives a few blocks away from him. He has not put on his TLSO brace by himself yet. He does not feel, at this time, that he will need HHC. His daughter is getting him a walker. Offered assistance if needed.  Ivan Waite -[phone]

## 2021-09-28 VITALS
HEIGHT: 72 IN | SYSTOLIC BLOOD PRESSURE: 144 MMHG | WEIGHT: 285 LBS | OXYGEN SATURATION: 94 % | DIASTOLIC BLOOD PRESSURE: 78 MMHG | TEMPERATURE: 98.2 F | HEART RATE: 60 BPM | RESPIRATION RATE: 18 BRPM | BODY MASS INDEX: 38.6 KG/M2

## 2021-09-28 LAB
ANION GAP SERPL CALCULATED.3IONS-SCNC: 6 MMOL/L (ref 7–16)
BUN BLDV-MCNC: 13 MG/DL (ref 6–23)
CALCIUM SERPL-MCNC: 9 MG/DL (ref 8.6–10.2)
CHLORIDE BLD-SCNC: 101 MMOL/L (ref 98–107)
CO2: 29 MMOL/L (ref 22–29)
CREAT SERPL-MCNC: 1 MG/DL (ref 0.7–1.2)
GFR AFRICAN AMERICAN: >60
GFR NON-AFRICAN AMERICAN: >60 ML/MIN/1.73
GLUCOSE BLD-MCNC: 90 MG/DL (ref 74–99)
HCT VFR BLD CALC: 42.2 % (ref 37–54)
HEMOGLOBIN: 13.7 G/DL (ref 12.5–16.5)
MCH RBC QN AUTO: 30.5 PG (ref 26–35)
MCHC RBC AUTO-ENTMCNC: 32.5 % (ref 32–34.5)
MCV RBC AUTO: 94 FL (ref 80–99.9)
PDW BLD-RTO: 13 FL (ref 11.5–15)
PLATELET # BLD: 153 E9/L (ref 130–450)
PMV BLD AUTO: 9.7 FL (ref 7–12)
POTASSIUM SERPL-SCNC: 4 MMOL/L (ref 3.5–5)
RBC # BLD: 4.49 E12/L (ref 3.8–5.8)
SODIUM BLD-SCNC: 136 MMOL/L (ref 132–146)
WBC # BLD: 4.8 E9/L (ref 4.5–11.5)

## 2021-09-28 PROCEDURE — 99232 SBSQ HOSP IP/OBS MODERATE 35: CPT | Performed by: NEUROLOGICAL SURGERY

## 2021-09-28 PROCEDURE — 97530 THERAPEUTIC ACTIVITIES: CPT

## 2021-09-28 PROCEDURE — 36415 COLL VENOUS BLD VENIPUNCTURE: CPT

## 2021-09-28 PROCEDURE — 85027 COMPLETE CBC AUTOMATED: CPT

## 2021-09-28 PROCEDURE — 80048 BASIC METABOLIC PNL TOTAL CA: CPT

## 2021-09-28 PROCEDURE — 6370000000 HC RX 637 (ALT 250 FOR IP): Performed by: STUDENT IN AN ORGANIZED HEALTH CARE EDUCATION/TRAINING PROGRAM

## 2021-09-28 PROCEDURE — 6360000002 HC RX W HCPCS: Performed by: SURGERY

## 2021-09-28 PROCEDURE — 97535 SELF CARE MNGMENT TRAINING: CPT

## 2021-09-28 PROCEDURE — 99239 HOSP IP/OBS DSCHRG MGMT >30: CPT | Performed by: SURGERY

## 2021-09-28 RX ORDER — METHOCARBAMOL 500 MG/1
1000 TABLET, FILM COATED ORAL 4 TIMES DAILY
Qty: 56 TABLET | Refills: 0 | Status: SHIPPED | OUTPATIENT
Start: 2021-09-28 | End: 2021-09-28 | Stop reason: HOSPADM

## 2021-09-28 RX ORDER — TIZANIDINE 2 MG/1
2 TABLET ORAL EVERY 6 HOURS PRN
Qty: 28 TABLET | Refills: 0 | Status: SHIPPED | OUTPATIENT
Start: 2021-09-28 | End: 2021-10-05 | Stop reason: SDUPTHER

## 2021-09-28 RX ORDER — OXYCODONE HYDROCHLORIDE 5 MG/1
5 TABLET ORAL EVERY 6 HOURS PRN
Qty: 20 TABLET | Refills: 0 | Status: SHIPPED | OUTPATIENT
Start: 2021-09-28 | End: 2021-10-03

## 2021-09-28 RX ADMIN — POLYETHYLENE GLYCOL 3350 17 G: 17 POWDER, FOR SOLUTION ORAL at 08:37

## 2021-09-28 RX ADMIN — METHOCARBAMOL TABLETS 1000 MG: 500 TABLET, COATED ORAL at 12:21

## 2021-09-28 RX ADMIN — ACETAMINOPHEN 650 MG: 325 TABLET ORAL at 00:00

## 2021-09-28 RX ADMIN — ACETAMINOPHEN 650 MG: 325 TABLET ORAL at 06:05

## 2021-09-28 RX ADMIN — DOCUSATE SODIUM 50 MG AND SENNOSIDES 8.6 MG 1 TABLET: 8.6; 5 TABLET, FILM COATED ORAL at 08:39

## 2021-09-28 RX ADMIN — METHOCARBAMOL TABLETS 1000 MG: 500 TABLET, COATED ORAL at 08:37

## 2021-09-28 RX ADMIN — BISACODYL 5 MG: 5 TABLET, COATED ORAL at 08:39

## 2021-09-28 RX ADMIN — OXYCODONE HYDROCHLORIDE 10 MG: 10 TABLET ORAL at 00:35

## 2021-09-28 RX ADMIN — ENOXAPARIN SODIUM 40 MG: 40 INJECTION SUBCUTANEOUS at 08:37

## 2021-09-28 RX ADMIN — ACETAMINOPHEN 650 MG: 325 TABLET ORAL at 12:21

## 2021-09-28 ASSESSMENT — PAIN DESCRIPTION - LOCATION
LOCATION: BACK

## 2021-09-28 ASSESSMENT — PAIN DESCRIPTION - DESCRIPTORS
DESCRIPTORS: ACHING;DISCOMFORT;SORE

## 2021-09-28 ASSESSMENT — PAIN DESCRIPTION - FREQUENCY
FREQUENCY: CONTINUOUS

## 2021-09-28 ASSESSMENT — PAIN SCALES - GENERAL
PAINLEVEL_OUTOF10: 3
PAINLEVEL_OUTOF10: 3
PAINLEVEL_OUTOF10: 0
PAINLEVEL_OUTOF10: 3
PAINLEVEL_OUTOF10: 0
PAINLEVEL_OUTOF10: 0
PAINLEVEL_OUTOF10: 7

## 2021-09-28 ASSESSMENT — PAIN DESCRIPTION - PAIN TYPE
TYPE: ACUTE PAIN

## 2021-09-28 NOTE — PROGRESS NOTES
Physical Therapy    Name: Deb Ruth  : 1955  MRN: [id number]      Date of Service: 2021    Evaluating PT: Garima Dobbins, PT, DPT [id number]      Room #:  9059/5017-I  Diagnosis:  Fall (on) (from) unspecified stairs and steps, initial encounter [W10. 9XXA]  Precautions:  Fall risk, T7 vertebral body fx, L1 transverse process fx, soft TLSO, B rib fxs, hemothorax    SUBJECTIVE:    Pt lives alone in a single story condo with 1 stair(s) and no rail(s) to enter. Bed is on the first floor and bath is on the first floor. Pt ambulated without AD prior to admission. OBJECTIVE:   Initial Evaluation  Date: 21 Treatment Date:  21 Short Term/ Long Term   Goals   AM-PAC 6 Clicks  88/88    Was pt agreeable to Eval/treatment? Yes yes    Does pt have pain? 7-8/10 lower back pain Yes 5/10    Bed Mobility  Rolling: Mod A  Supine to sit: Mod A  Sit to supine: NT  Scooting: SBA to EOB Ind with bed mobility slow due to pain. Rolling: Independent   Supine to sit: Independent   Sit to supine: Independent   Scooting: Independent    Transfers Sit to stand: Min A  Stand to sit: Min A  Stand pivot: CGA with [facility] Sit to stand: SBA for bedside chair. Stand to sit: SBA  Stand pivot: SBA with [facility]  Discussed transfer into car. Sit to stand: Independent   Stand to sit: Independent   Stand pivot: Independent vs Mod Independent with [facility]   Ambulation   50 feet with WW with  feet with fww for support with SBA. 200 feet Independent vs Mod Independent with [facility]   Stair negotiation: ascended and descended NT NT 1 platform step Independent vs Mod Independent with [facility]   ROM BUE: Refer to OT note  BLE: WFL     Strength BUE: Refer to OT note  BLE: NT     Balance Sitting EOB: Supervision  Dynamic Standing: CGA with [facility]  Sitting EOB: Independent   Dynamic Standing: Independent vs Mod Independent with [facility]     Pt is A & O x: 4 to person, place, month/year, and situation.   Sensation: Pt denies numbness and tingling of extremities. Edema: Unremarkable. Patient education  Pt educated on spinal neutrality. Patient response to education:   Pt verbalized understanding Pt demonstrated skill Pt requires further education in this area   Yes Yes Reinforce     ASSESSMENT:    Conditions Requiring Skilled Therapeutic Intervention:    [x]Decreased strength     []Decreased ROM  [x]Decreased functional mobility  [x]Decreased balance   []Decreased endurance   []Decreased posture  []Decreased sensation  []Decreased coordination   []Decreased vision  []Decreased safety awareness   [x]Increased pain       Comments:    Pt was in bed finishing breakfast.  Reviewed spinal precautions with good understanding. Also reviewed application of TLSO and adjustment of the device while in supine position. Required fww for gait into hallway with cues for sequencing. Required SBA once standing. No LOB noted. Pt reported pain improved with activity. Pt ambulated into hallway with cues for sequencing., Pt was left in chair with all needs met at conclusion of session. Treatment:  Patient practiced and was instructed in the following treatment:     Therapeutic activities  o Transfers: Pt was cued for hand placement during sit <> stand transfers. o Ambulation: Pt ambulated with [employer] as he was cued for posture and WW technique. o Education: Pt was educated on spinal neutrality and soft TLSO brace. Pt was instructed to take brace off when sleeping in bed to protect skin integrity. Pt's/family goals:  1. To return home. Prognosis is Good for reaching above PT goals. Patient and or family understand(s) diagnosis, prognosis, and plan of care. Yes.     PHYSICAL THERAPY PLAN OF CARE:    PT POC is established based on physician order and patient diagnosis     Referring provider/PT Order:    Start   Ordering Provider    09/25/21 0015  PT evaluation and treat Start: 09/25/21 0015, End: 09/25/21 0015, ONE TIME, Standing Count: 1 MARLON Polanco TRISH Kuo DO        Diagnosis:  Fall (on) (from) unspecified stairs and steps, initial encounter [W10. 9XXA]  Specific instructions for next treatment:  Attempt platform step. Current Treatment Recommendations:     [x] Strengthening to improve independence with functional mobility   [] ROM to improve independence with functional mobility   [x] Balance Training to improve static/dynamic balance and to reduce fall risk  [] Endurance Training to improve activity tolerance during functional mobility   [x] Transfer Training to improve safety and independence with all functional transfers   [x] Gait Training to improve gait mechanics, endurance and assess need for appropriate assistive device  [] Stair Training in preparation for safe discharge home and/or into the community   [x] Positioning to prevent skin breakdown and contractures  [x] Safety and Education Training   [] Patient/Caregiver Education   [] HEP  [] Other     PT long term treatment goals are located in above grid    Frequency of treatments: Daily x 3-5 days. Time in  0845  Time out  0915    Total Treatment Time  25minutes     Evaluation Time includes thorough review of current medical information, gathering information on past medical history/social history and prior level of function, completion of standardized testing/informal observation of tasks, assessment of data and education on plan of care and goals.     CPT codes:  [] Low Complexity PT evaluation 26213  [] Moderate Complexity PT evaluation 95199  [] High Complexity PT evaluation 26641  [] PT Re-evaluation 27619  [] Gait training 72333 0 minutes  [] Manual therapy 04324 0 minutes  [x] Therapeutic activities 19284 25 minutes  [] Therapeutic exercises 50528 0 minutes  [] Neuromuscular reeducation 33803 0 minutes     Gucci Males, [id number] [facility] [place], [place]

## 2021-09-28 NOTE — PROGRESS NOTES
TRAUMA SURGERY  ATTENDING PROGRESS NOTE      CC: fall 12 feet    S:   doing fairly well getting up and moving better now, no cp sob,     O:   @BP (!) 144/78   Pulse 60   Temp 98.2 °F (36.8 °C) (Temporal)   Resp 18   Ht 6' (1.829 m)   Wt 285 lb (129.3 kg)   SpO2 94%   BMI 38.65 kg/m² @    Gen - no apparent distress   Neuro - Awake, alert, attentive    HEENT - PERRL 3,, conj pink   Lungs - non labored, BS clear b/l    Heart - RR no extra heart sounds    Abdomen - NAD, obese,   Ext- rom wnl NVI     A/P: s/p fall T7 fx, fib fx,       Active Problems:    Fall (on) (from) unspecified stairs and steps, initial encounter    Kidney laceration, right    Multiple closed fractures of ribs of both sides    Closed T7 fracture (HCC)  Resolved Problems:    * No resolved hospital problems. *      ASSESSMENT/PLAN:  -  G4 Right Kidney laceration + cyst--monitor H/H, monitor UOP  -  Bilateral rib fractures--analgesia, pulmonary toilet   -  Small hemothorax--f/u CXR ordered today   -  T7 compression fracture--NSGY TLSO non op   -  L1 tp fracture--analgesia.    -  Morbid obesity, BM 38--diet, exercise education  - PTOT     DISCHARGE     DVT prophylaxis: SCDs, Ihsan Johnson MD FACS

## 2021-09-28 NOTE — PROGRESS NOTES
CLINICAL PHARMACY NOTE: MEDS TO BEDS    Total # of Prescriptions Filled: 2   The following medications were delivered to the patient:  · OXYCODONE HCL 5 MG  · Sherle Benavides 2 MG    Additional Documentation:

## 2021-09-28 NOTE — PROGRESS NOTES
OCCUPATIONAL THERAPY TREATMENT NOTE    [facility] TREATMENT NOTE      Date:2021  Patient Name: Blank Holloway  MRN: [id number]  : 1955  Room: [address] [place] [address]     Evaluating OT: Sunita Henderson, OTR/L License #  [id number]        Referring Provider: Jewel Kuo DO    Specific Provider Orders/Date: OT evaluation & treatment        Diagnosis: Fall/ Trauma  Concussion, T7 L1 TP fxs., R 7,8,11,12 rib fxs., L 6,7 rib fxs., hemothorax, Kidney lac.     Surgery:   Past Surgical History   No past surgical history on file.        Pertinent Medical History:      Past Medical History   No past medical history on file.       Precautions:  Fall Risk, TLSO, neutral spine      Assessment of current deficits    []? ? Functional mobility            [x]? ?ADLs           [x]? ? Strength                  [x]? ? Cognition    [x]? ? Functional transfers          [x]? ? IADLs         [x]? ? Safety Awareness   [x]? ?Endurance    [x]? ? Fine Coordination                         [x]? ? Balance      []? ? Vision/perception   [x]? ? Sensation      []? ?Gross Motor Coordination             []? ? ROM           []? ? Delirium                   []? ? Motor Control      OT PLAN OF CARE   OT POC based on physician orders, patient diagnosis and results of clinical assessment     Frequency/Duration: 2-4 days/wk for 2 weeks PRN   Specific OT Treatment Interventions to include:   Instruction/training on adapted ADL techniques and AE recommendations to increase functional independence within precautions  Training on energy conservation strategies, correct breathing pattern and techniques to improve independence/tolerance for self-care routine  Functional transfer/mobility training/DME recommendations for increased independence, safety, and fall prevention  Patient/Family education to increase follow through with safety techniques and functional independence  Recommendation of environmental modifications for increased safety with functional transfers/mobility and ADLs  Therapeutic exercise to improve motor endurance, ROM, and functional strength for ADLs/functional transfers  Therapeutic activities to facilitate/challenge dynamic balance, stand tolerance for increased safety and independence with ADLs  Therapeutic activities to facilitate gross/fine motor skills for increased independence with ADLs  Positioning to improve skin integrity, interaction with environment and functional independence     Recommended Adaptive Equipment:  TBD for A.E., A.D. per PT     Home Living: Pt lives alone in a 1 story with no steps to enter with no HR.  B&B on main level. Bathroom setup: walk in shower, higher commode seat   Equipment owned: shower bench     Prior Level of Function: Ind. with ADLs , Ind. with IADLs; ambulated no A.D. Driving: active  Occupation: construction     Pain Level: 1/10 back pain at rest, 10/10 when rolling, Min once seated and standing  Cognition: A&O: 4/4; Follows 2 step directions              Memory:  good              Sequencing:  good              Problem solving:  good              Judgement/safety:  Fair/+ with occasional cue for technique/ neutral spine    Functional Assessment:  AM-PAC Daily Activity Raw Score: 16/24    Initial Eval Status  Date: 9-26-21 Treatment Status  Date: 9/28/21 STGs = LTGs  Time frame: 10-14 days   Feeding Independent   Ind     Grooming Set up seated Sup seated  To comb hair  Modified [place]    UB Dressing Max A with TLSO adjustments, pt. instructed RE: fit & donning/ doffing technique, however pt. reports slept in it previous night. Cautioned pt. against sleeping in brace to prevent skin breakdown. Max A  To ej TLSO supine/rolling in bed, pt. able to make adjustments supine  Modified [place]    LB Dressing Mod A to thread pants over B feet seated  Min A   seated, A.E. Modified [place]    Bathing Mod A with sim. task Min A  With sim.  task, A.E.  Modified [place]    Toileting NT N/T  Modified [place]    Bed Mobility  Supine to sit: NT  Sit to supine: NT Logroll: SBA  Sup > Sit: SBA with min. cues for technique & neutral mechanics    Supine to sit: Modified [place]   Sit to supine: Modified [place]    Functional Transfers Min A with sit <> stand  CGA with SPT using ww SBA sit<->stand, SPT with ww  Cuing for hand placement and body mechanics  Modified [place]    Functional Mobility CGA with ww ~household distances SBA   using   w/w >household distances  Modified [place]    Balance Sitting:     Static:  Ind.    Dynamic:Sup  Standing:  CGA  Sitting:     Static:  Ind.    Dynamic:Sup  Standing:  SBA     Activity Tolerance Fair+ with lt/mod. Ax.  Fair Good with mod. Ax. Visual/  Perceptual Glasses: readers          Vitals WFL  spO2 99%  HR 68 bpm after ax. WFL       Comments: Upon arrival pt supine in bed. Therapist facilitated donning/instruction TLSO (pt. States dtr. Can assist in the mornings to ej TLSO, pt. Able to doff TLSO Ind. In supine), ADLs, functional transfer/mobility training with focus on safety, technique, precautions. Pt. Instructed RE: safe transfers/mobility, ADLs, role of OT, treatment plan, recs. , prec. At end of session seated in bedside chair, all needs met, RN notified, all lines and tubes intact, call light within reach. · Pt has made good progress towards set goals. · Continue with current plan of care    Treatment Time In: 8:45            Treatment Time Out: 9:15                Treatment Charges: Mins Units   Ther Ex  51704     Manual Therapy [id number] [facility]     Thera Activities 00811 10 1   ADL/Home Mgt 21422 20 1   Neuro Re-ed 58047     Group Therapy      Orthotic manage/training  90634     Non-Billable Time     Total Timed Treatment 30 2     Sapphire Henderson, OTR/L   License #  [id number]

## 2021-09-28 NOTE — PROGRESS NOTES
Department of Neurosurgery  Progress Note    CHIEF COMPLAINT: T7 Fx & L1 Transverse fracture s/p fall    SUBJECTIVE:  No acute events overnight. Patient states he is able to wear/put on brace. Pain is well controlled. Patient denies any new numbness or weakness, as well as denies any bowel or bladder incontinence. REVIEW OF SYSTEMS :  Constitutional: Negative for chills and fever. Neurological: Negative for dizziness, tremors and speech change.      OBJECTIVE:   VITALS:  /66   Pulse 58   Temp 97.1 °F (36.2 °C)   Resp 16   Ht 6' (1.829 m)   Wt 285 lb (129.3 kg)   SpO2 94%   BMI 38.65 kg/m²     PHYSICAL  Alert, oriented  Appears stated age  PERRL  EOMI  Strength full  Sensation intact to light touch    DATA:  CBC:   Lab Results   Component Value Date    WBC 4.8 09/28/2021    RBC 4.49 09/28/2021    HGB 13.7 09/28/2021    HCT 42.2 09/28/2021    MCV 94.0 09/28/2021    MCH 30.5 09/28/2021    MCHC 32.5 09/28/2021    RDW 13.0 09/28/2021     09/28/2021    MPV 9.7 09/28/2021     BMP:    Lab Results   Component Value Date     09/27/2021    K 4.3 09/27/2021    K 3.9 09/25/2021     09/27/2021    CO2 27 09/27/2021    BUN 13 09/27/2021    LABALBU 4.5 09/24/2021    CREATININE 0.9 09/27/2021    CALCIUM 8.7 09/27/2021    GFRAA >60 09/27/2021    LABGLOM >60 09/27/2021    GLUCOSE 102 09/27/2021     PT/INR:  No results found for: PROTIME, INR  PTT:  No results found for: APTT, PTT[APTT}    Current Inpatient Medications  Current Facility-Administered Medications: magnesium hydroxide (MILK OF MAGNESIA) 400 MG/5ML suspension 30 mL, 30 mL, Oral, Daily PRN  enoxaparin (LOVENOX) injection 40 mg, 40 mg, SubCUTAneous, BID  sodium chloride flush 0.9 % injection 10 mL, 10 mL, IntraVENous, 2 times per day  sodium chloride flush 0.9 % injection 10 mL, 10 mL, IntraVENous, PRN  0.9 % sodium chloride infusion, 25 mL, IntraVENous, PRN  ondansetron (ZOFRAN-ODT) disintegrating tablet 4 mg, 4 mg, Oral, Q8H PRN **OR** ondansetron (ZOFRAN) injection 4 mg, 4 mg, IntraVENous, Q6H PRN  polyethylene glycol (GLYCOLAX) packet 17 g, 17 g, Oral, Daily  acetaminophen (TYLENOL) tablet 650 mg, 650 mg, Oral, 4 times per day  oxyCODONE (ROXICODONE) immediate release tablet 5 mg, 5 mg, Oral, Q4H PRN **OR** oxyCODONE HCl (OXY-IR) immediate release tablet 10 mg, 10 mg, Oral, Q4H PRN  methocarbamol (ROBAXIN) tablet 1,000 mg, 1,000 mg, Oral, 4x Daily  bisacodyl (DULCOLAX) EC tablet 5 mg, 5 mg, Oral, Daily  sennosides-docusate sodium (SENOKOT-S) 8.6-50 MG tablet 1 tablet, 1 tablet, Oral, BID    ASSESSMENT:   -Corazon العراقي is a 76 y/o male who presents with a T7 fracture and L1 transverse fx after a fall. Stable. PLAN:  - Continue pain control  - Continue to wear brace for 3 months  - Okay to d/c from our standpoint  - Follow up in clinic in 4 week with upright Xrays    Electronically signed by Sai Ross on 9/28/2021 at 8:37 AM     Nsx Attending:    Patient was seen and examined by me with the team.  I personally reviewed all pertinent radiological images. I concur with Miss Lazo's clinical assessment and plan. Thank you so much for allowing us to participate in the care of this patient.

## 2021-09-28 NOTE — PROGRESS NOTES
The pt was given discharge instructions, IV removed, and no issues at the time of d/c. Trauma resident aware the pt heard a pop when twisting in the bathroom - no increased pain, no numbness or tingling. Nothing to do. OK to d/c.

## 2021-10-04 NOTE — PROGRESS NOTES
Physician Progress Note      PATIENT:               Elayne Dahl  CSN #:                  [id number]  :                       1955  ADMIT DATE:       2021 5:07 PM  [address] DATE:        2021 6:00 PM  RESPONDING  PROVIDER #:        Yanet Rodríguez MD          QUERY TEXT:    Dear Dr. Chirag Wiley,    Patient admitted after fall with multiple rib fractures. Documentation   reflects small hemothorax in progress notes and CT chest prior to discharge   from Admission to discharge. If possible, please document in the progress   notes and discharge summary if traumatic hemothorax was: The medical record reflects the following:  Risk Factors: Multiple rib fractures  Clinical Indicators: H&P on  states: \"I  Independently reviewed the   available laboratory studies and diagnostic imaging. Pinky Angles Pinky Angles Small hemothorax\",   General surgery Progress note on  in the morning for first rounds under   assessment/plan: \"-  Small hemothorax--f/u CXR ordered today\"  Treatment: Close pt monitoring, Serial imaging, Oxygen therapy PRN    Thank you,  Claudia CHISHOLM, RN  Clinical Documentation Improvement  [email or web address]'Office #: [phone]  Options provided:  -- Traumatic hemothorax confirmed after study  -- Traumatic hemothorax ruled out after study  -- Other - I will add my own diagnosis  -- Disagree - Not applicable / Not valid  -- Disagree - Clinically unable to determine / Unknown  -- Refer to Clinical Documentation Reviewer    PROVIDER RESPONSE TEXT:    Traumatic hemothorax confirmed after study.     Query created by: Alcides Tapia on 10/4/2021 10:51 AM      Electronically signed by:  Yanet Rodríguez MD 10/4/2021 12:15 PM

## 2021-10-05 ENCOUNTER — OFFICE VISIT (OUTPATIENT)
Dept: SURGERY | Age: 66
End: 2021-10-05
Payer: MEDICARE

## 2021-10-05 VITALS
HEIGHT: 72 IN | SYSTOLIC BLOOD PRESSURE: 122 MMHG | OXYGEN SATURATION: 97 % | DIASTOLIC BLOOD PRESSURE: 63 MMHG | BODY MASS INDEX: 39.68 KG/M2 | WEIGHT: 293 LBS | HEART RATE: 60 BPM | RESPIRATION RATE: 15 BRPM

## 2021-10-05 DIAGNOSIS — S22.43XD MULTIPLE CLOSED FRACTURES OF RIBS OF BOTH SIDES WITH ROUTINE HEALING, SUBSEQUENT ENCOUNTER: Primary | ICD-10-CM

## 2021-10-05 PROCEDURE — G8417 CALC BMI ABV UP PARAM F/U: HCPCS | Performed by: NURSE PRACTITIONER

## 2021-10-05 PROCEDURE — 3017F COLORECTAL CA SCREEN DOC REV: CPT | Performed by: NURSE PRACTITIONER

## 2021-10-05 PROCEDURE — 1111F DSCHRG MED/CURRENT MED MERGE: CPT | Performed by: NURSE PRACTITIONER

## 2021-10-05 PROCEDURE — 99212 OFFICE O/P EST SF 10 MIN: CPT | Performed by: NURSE PRACTITIONER

## 2021-10-05 PROCEDURE — 99202 OFFICE O/P NEW SF 15 MIN: CPT | Performed by: NURSE PRACTITIONER

## 2021-10-05 PROCEDURE — 1036F TOBACCO NON-USER: CPT | Performed by: NURSE PRACTITIONER

## 2021-10-05 PROCEDURE — G8484 FLU IMMUNIZE NO ADMIN: HCPCS | Performed by: NURSE PRACTITIONER

## 2021-10-05 PROCEDURE — 1123F ACP DISCUSS/DSCN MKR DOCD: CPT | Performed by: NURSE PRACTITIONER

## 2021-10-05 PROCEDURE — G8427 DOCREV CUR MEDS BY ELIG CLIN: HCPCS | Performed by: NURSE PRACTITIONER

## 2021-10-05 PROCEDURE — 4040F PNEUMOC VAC/ADMIN/RCVD: CPT | Performed by: NURSE PRACTITIONER

## 2021-10-05 RX ORDER — HYDROCODONE BITARTRATE AND ACETAMINOPHEN 5; 325 MG/1; MG/1
1 TABLET ORAL EVERY 6 HOURS PRN
Qty: 56 TABLET | Refills: 0 | Status: SHIPPED | OUTPATIENT
Start: 2021-10-05 | End: 2021-10-19

## 2021-10-05 RX ORDER — TIZANIDINE 2 MG/1
2 TABLET ORAL EVERY 6 HOURS PRN
Qty: 28 TABLET | Refills: 0 | Status: SHIPPED | OUTPATIENT
Start: 2021-10-05 | End: 2021-10-12

## 2021-10-05 RX ORDER — IBUPROFEN 600 MG/1
600 TABLET ORAL 3 TIMES DAILY
Qty: 120 TABLET | Refills: 1 | Status: SHIPPED
Start: 2021-10-05 | End: 2022-04-04 | Stop reason: ALTCHOICE

## 2021-10-05 RX ORDER — OXYCODONE HYDROCHLORIDE 5 MG/1
5 TABLET ORAL EVERY 6 HOURS PRN
COMMUNITY
End: 2021-10-05 | Stop reason: ALTCHOICE

## 2021-10-05 NOTE — PROGRESS NOTES
Trauma Clinic Progress Note   10/5/2021     Date of injury: 9/24    TIM/Injuries:   Patient Active Problem List   Diagnosis Code    Fall (on) (from) unspecified stairs and steps, initial encounter W10. 9XXA    Kidney laceration, right Z62.431A    Multiple closed fractures of ribs of both sides S22.43XA    Closed T7 fracture ([facility] [address] S22.069A       Surgeries:   No past surgical history on file. Vital signs:    /63 (Site: Right Upper Arm, Position: Sitting, Cuff Size: Medium Adult)   Pulse 60   Resp 15   Ht 6' (1.829 m)   Wt 293 lb (132.9 kg)   SpO2 97%   BMI 39.74 kg/m²     Medications:    Prior to Admission medications    Medication Sig Start Date End Date Taking? Authorizing Provider   HYDROcodone-acetaminophen (NORCO) 5-325 MG per tablet Take 1 tablet by mouth every 6 hours as needed for Pain for up to 14 days. Intended supply: 5 days. Take lowest dose possible to manage pain 10/5/21 10/19/21 Yes LAURA Holman CNP   tiZANidine (ZANAFLEX) 2 MG tablet Take 1 tablet by mouth every 6 hours as needed (pain) 10/5/21 10/12/21 Yes LAURA Holman CNP   ibuprofen (ADVIL;MOTRIN) 600 MG tablet Take 1 tablet by mouth 3 times daily 10/5/21  Yes LAURA Holman CNP          CC:  Trauma follow up    Patient presents to the clinic today for follow-up on a fall in which he sustained bilateral rib fractures, hemothorax, kidney laceration as well as vertebral fractures. Overall he is doing well, has been staying with family who has been helping him. Pain is tolerable with medications, denies any shortness of breath fever or chills. Is using walker to get up and around and does have his TLSO brace on. Does state occasional dizziness and a headache. Denies any difficulty concentrating, short-term memory loss or changes in taste and smell. Has been trying to get up and walk around, appetite is good no difficulties with constipation.   Have a follow-up appointment with neurosurgery         Physical Exam Physical Exam  HENT:      Head: Atraumatic. Cardiovascular:      Rate and Rhythm: Normal rate. Heart sounds: Normal heart sounds. Pulmonary:      Effort: Pulmonary effort is normal. No respiratory distress. Breath sounds: Normal breath sounds. No stridor. No wheezing, rhonchi or rales. Chest:      Chest wall: No tenderness. Abdominal:      General: Abdomen is flat. Musculoskeletal:         General: Normal range of motion. Cervical back: Normal range of motion. Skin:     General: Skin is dry. Neurological:      Mental Status: He is alert and oriented to person, place, and time. Controlled substances monitoring: possible medication side effects, risk of tolerance and/or dependence, and alternative treatments discussed, no signs of potential drug abuse or diversion identified and OARRS report reviewed today- activity consistent with treatment plan. Education    Instructed to continue to use incentive spirometry 6-8 times per day. Instructed to increase activity. Instructed on concussion:  causes, definition, s&s, treatment, course of concussion. Instructed on opioid medications. Assessment  Patient Active Problem List   Diagnosis Code    Fall (on) (from) unspecified stairs and steps, initial encounter W10. 9XXA    Kidney laceration, right B09.167O    Multiple closed fractures of ribs of both sides S22.43XA    Closed T7 fracture [facility]) S22.069A     60-year-old male presenting to the clinic after his fall in which she sustained several rib fractures, concussion and thoracic fractures. Overall he is doing well. Did discuss normal sequela of ribs. He would like to change his oxycodone to something \"not as strong\". As he does not like how it makes him feel. Discussed at length pain and pain control measures. We will bring him down to [place] 3 times a day as needed, as well as muscle relaxer and ibuprofen.   Up this week with neurosurgery    Plan  RTC in 2 weeks    NOTE: This report was transcribed using voice recognition software.  Every effort was made to ensure accuracy; however, inadvertent computerized transcription errors may be present

## 2021-10-19 ENCOUNTER — OFFICE VISIT (OUTPATIENT)
Dept: SURGERY | Age: 66
End: 2021-10-19
Payer: MEDICARE

## 2021-10-19 VITALS
TEMPERATURE: 97.2 F | OXYGEN SATURATION: 98 % | HEIGHT: 72 IN | SYSTOLIC BLOOD PRESSURE: 141 MMHG | RESPIRATION RATE: 16 BRPM | WEIGHT: 285 LBS | HEART RATE: 73 BPM | DIASTOLIC BLOOD PRESSURE: 79 MMHG | BODY MASS INDEX: 38.6 KG/M2

## 2021-10-19 DIAGNOSIS — S22.43XD MULTIPLE CLOSED FRACTURES OF RIBS OF BOTH SIDES WITH ROUTINE HEALING, SUBSEQUENT ENCOUNTER: ICD-10-CM
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
## 2021-10-19 DIAGNOSIS — S37.031D LACERATION OF RIGHT KIDNEY, SUBSEQUENT ENCOUNTER: ICD-10-CM

## 2021-10-19 PROCEDURE — 1123F ACP DISCUSS/DSCN MKR DOCD: CPT | Performed by: NURSE PRACTITIONER

## 2021-10-19 PROCEDURE — 1111F DSCHRG MED/CURRENT MED MERGE: CPT | Performed by: NURSE PRACTITIONER

## 2021-10-19 PROCEDURE — G8427 DOCREV CUR MEDS BY ELIG CLIN: HCPCS | Performed by: NURSE PRACTITIONER

## 2021-10-19 PROCEDURE — 3017F COLORECTAL CA SCREEN DOC REV: CPT | Performed by: NURSE PRACTITIONER

## 2021-10-19 PROCEDURE — G8417 CALC BMI ABV UP PARAM F/U: HCPCS | Performed by: NURSE PRACTITIONER

## 2021-10-19 PROCEDURE — 1036F TOBACCO NON-USER: CPT | Performed by: NURSE PRACTITIONER

## 2021-10-19 PROCEDURE — G8484 FLU IMMUNIZE NO ADMIN: HCPCS | Performed by: NURSE PRACTITIONER

## 2021-10-19 PROCEDURE — 99212 OFFICE O/P EST SF 10 MIN: CPT | Performed by: NURSE PRACTITIONER

## 2021-10-19 PROCEDURE — 4040F PNEUMOC VAC/ADMIN/RCVD: CPT | Performed by: NURSE PRACTITIONER

## 2021-10-19 RX ORDER — TIZANIDINE 4 MG/1
2 TABLET ORAL EVERY 8 HOURS PRN
Qty: 21 TABLET | Refills: 0 | Status: SHIPPED
Start: 2021-10-19 | End: 2022-02-21 | Stop reason: ALTCHOICE

## 2021-10-19 RX ORDER — TIZANIDINE 2 MG/1
2 TABLET ORAL EVERY 6 HOURS PRN
COMMUNITY
End: 2022-02-21 | Stop reason: ALTCHOICE

## 2021-10-19 NOTE — PATIENT INSTRUCTIONS
[place] [id number]  Trauma Services  Additional Discharge Instructions    Call [phone] for any questions/concerns. Please follow the instructions checked below:  Please follow up with your primary care provider. ACTIVITY INSTRUCTIONS  Increase activity as tolerated  No heavy lifting or strenuous activity  Take your incentive spirometer home and use 4-6 times/day   [x]  No driving until cleared by Neurosurgery. WOUND/DRESSING INSTRUCTIONS:  You may shower. No sitting in bath tub, hot tub or swimming until cleared by physician. Ice to areas of pain for first 24 hours. Heat to areas of pain after that. Wash areas of lacerations/abrasions with soap & water. Rinse well. Pat dry with clean towel. Apply thin layer of Bacitracin, Neosporin, or triple antibiotic cream to affected area 2-3 times per day. Keep wounds clean and dry. MEDICATION INSTRUCTIONS  Take medication as prescribed. When taking pain medications, you may experience dizziness or drowsiness. Do not drink alcohol or drive when taking these medications. You may experience constipation while taking pain medication. You may take over the counter stool softeners such as docusate (Colace), sennosides S (Senokot-S), or Miralax. [x]  You may take acetaminophen (Tylenol) products. Do NOT take more than 4000mg of Tylenol in 24h. OPIOID MEDICATION INSTRUCTIONS  Read the medication guide that is included with your prescription. Take your medication exactly as prescribed. Store medication away from children and in a safe place. Do NOT share your medication with others. Do NOT take medication unless it is prescribed for you. Do NOT drink alcohol while taking opioids (I.e., Norco, Percocet, Oxycodone, etc). Discuss with the Trauma Clinic staff if the dose of medication you are taking does not control your pain and any side effects that you may be having.       CALL 911 OR YOUR LOCAL EMERGENCY SERVICE:  --If you take too much medication  --If you have trouble breathing or shortness of breath  --A child has taken this medication. WORK:  You may not return to work until you receive follow-up with the Trauma Clinic or clearance by all consultants. Call the trauma clinic for any of the following or for questions/concerns;  --fever over 101F  --redness, swelling, hardness or warmth at the wound site(s).   --Unrelieved nausea/vomiting  --Foul smelling or cloudy drainage at the wound site(s)  --Unrelieved pain or increase in pain  --Increase in shortness of breath    Follow-up:  Trauma Clinic: [phone] [address]  [place], [phone] [address]

## 2021-10-19 NOTE — PROGRESS NOTES
Trauma Clinic Progress Note   10/19/2021     Date of injury: September 24, 2021         TIM/Injuries:   Patient Active Problem List   Diagnosis Code    Fall (on) (from) unspecified stairs and steps, initial encounter W10. 9XXA    Kidney laceration, right V06.288I    Multiple closed fractures of ribs of both sides S22.43XA    Closed T7 fracture ([facility] [address] S22.069A       Surgeries:   No past surgical history on file. Vital signs:    BP (!) 141/79   Pulse 73   Temp 97.2 °F (36.2 °C) (Infrared)   Resp 16   Ht 6' (1.829 m)   Wt 285 lb (129.3 kg)   SpO2 98%   BMI 38.65 kg/m²     Medications:    Prior to Admission medications    Medication Sig Start Date End Date Taking? Authorizing Provider   tiZANidine (ZANAFLEX) 2 MG tablet Take 2 mg by mouth every 6 hours as needed   Yes Historical Provider, MD   HYDROcodone-acetaminophen (NORCO) 5-325 MG per tablet Take 1 tablet by mouth every 6 hours as needed for Pain for up to 14 days. Intended supply: 5 days. Take lowest dose possible to manage pain 10/5/21 10/19/21 Yes LAURA Lundberg CNP   ibuprofen (ADVIL;MOTRIN) 600 MG tablet Take 1 tablet by mouth 3 times daily 10/5/21  Yes LAURA Lundberg CNP          CC:  Trauma follow up    This 49-year-old man presents to the clinic after sustaining a fall from a scaffolding. Injuries include multiple rib fractures bilaterally, concussion, renal laceration, and T7 back fracture. He has been seen in the clinic previously. He denies any fever, chills, cough or shortness of breath. He is wearing his thoracic back brace. He denies any shortness of breath or difficulty breathing. He is sleeping in a recliner. 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 He is using Norco and Zanaflex to control his pain. He only takes Norco about once daily.   He did take a Norco before he came today because he thought he would be in pain with the long walk to the office. He has minimal back pain. He states that he has a dull headache at times he has not had any headache over the last 2 days. He does have slight dizziness when he is up ambulating. He has noticed that his bilateral lower extremities are slightly swollen. Discussed fluid intake. We discussed using the recliner to elevate his legs. He does have compression hose at home. He did wear the compression hose yesterday. He did not wear the compression hose today. He states he needs a refill on his Zanaflex but he has enough Norco remaining. Physical Exam  Physical Exam  Vitals reviewed. Cardiovascular:      Rate and Rhythm: Normal rate and regular rhythm. Pulses: Normal pulses. Heart sounds: Normal heart sounds. Comments: Slight bilateral lower extremity swelling. Pulmonary:      Effort: Pulmonary effort is normal.      Breath sounds: Normal breath sounds. Musculoskeletal:         General: Normal range of motion. Skin:     General: Skin is warm and dry. Neurological:      Mental Status: He is alert. Education  Encouraged to wear back brace as per the directions of neurosurgery  Instructed on concussion:  causes, definition, s&s, treatment, course of concussion. Instructed on opioid medications. Instructed on weaning of narcotics/pain medications. Controlled Substance Monitoring:    Acute and Chronic Pain Monitoring:   RX Monitoring 10/5/2021   Periodic Controlled Substance Monitoring Possible medication side effects, risk of tolerance/dependence & alternative treatments discussed. ;No signs of potential drug abuse or diversion identified.;Obtaining appropriate analgesic effect of treatment. Assessment  Patient Active Problem List   Diagnosis Code    Fall (on) (from) unspecified stairs and steps, initial encounter W10. 9XXA    Kidney laceration, right K68.577A    Multiple closed fractures of ribs of both sides S22.43XA    Closed T7 fracture ([facility]) L44.412X       Plan  Return to clinic 2 weeks  Follow up with neurosurgery. Zanaflex 2 mg every 6 hours as needed for muscle spasms. NOTE: This report was transcribed using voice recognition software.  Every effort was made to ensure accuracy; however, inadvertent computerized transcription errors may be present

## 2021-10-25 DIAGNOSIS — S22.008D: Primary | ICD-10-CM

## 2021-10-28 ENCOUNTER — HOSPITAL ENCOUNTER (OUTPATIENT)
Age: 66
Discharge: HOME | End: 2021-10-30
Payer: MEDICARE

## 2021-10-28 ENCOUNTER — HOSPITAL ENCOUNTER (OUTPATIENT)
Dept: GENERAL RADIOLOGY | Age: 66
Discharge: HOME | End: 2021-10-30
Payer: MEDICARE

## 2021-10-28 ENCOUNTER — OFFICE VISIT (OUTPATIENT)
Dept: NEUROSURGERY | Age: 66
End: 2021-10-28
Payer: MEDICARE

## 2021-10-28 VITALS
RESPIRATION RATE: 20 BRPM | WEIGHT: 285 LBS | HEIGHT: 72 IN | BODY MASS INDEX: 38.6 KG/M2 | SYSTOLIC BLOOD PRESSURE: 132 MMHG | TEMPERATURE: 98.1 F | DIASTOLIC BLOOD PRESSURE: 78 MMHG | OXYGEN SATURATION: 97 % | HEART RATE: 82 BPM

## 2021-10-28 DIAGNOSIS — S22.008D: ICD-10-CM

## 2021-10-28 DIAGNOSIS — S22.060D CLOSED WEDGE COMPRESSION FRACTURE OF T7 VERTEBRA WITH ROUTINE HEALING, SUBSEQUENT ENCOUNTER: Primary | ICD-10-CM

## 2021-10-28 PROCEDURE — 99213 OFFICE O/P EST LOW 20 MIN: CPT | Performed by: STUDENT IN AN ORGANIZED HEALTH CARE EDUCATION/TRAINING PROGRAM

## 2021-10-28 PROCEDURE — 72100 X-RAY EXAM L-S SPINE 2/3 VWS: CPT

## 2021-10-28 PROCEDURE — 72072 X-RAY EXAM THORAC SPINE 3VWS: CPT

## 2021-10-28 NOTE — PROGRESS NOTES
Hospital Follow-up     This is a 77year old male who presents to the office for a 1 month follow-up s/p T7 compression fx and L1 transverse fracture      Subjective: Patient states he doing well. He states he no longer needs pain pills for back pain and hasn't taken any in 3 days. Denies any numbness or weakness in his leg. Denies any bowel or bladder incontinence. Brace compliant.      Physical Exam:              WDWN, no apparent distress              Non-labored breathing               Vitals Stable              Alert and oriented x3              EOMI              HARDING well              Motor strength symmetric              Sensation to LT intact bilaterally                  Imaging: 10/28/2021 Thoracic and Lumbar XR  No acute processes noted -final read pending.      Assessment: This is a 77 y.o.  male presenting for a 1 month hospital follow-up s/p T7 compression fracture and L1 transverse fx.     Plan:  -Pain control and expectations discussed  -OARRS reviewed  -Follow-up in neurosurgery clinic in 2 months with repeat Thoracic XR  -Call or return to neurosurgery office sooner if symptoms worsen or if new issues arise in the interim.     Electronically signed by Dean Donaldson PA-C on 10/28/2021 at 2:55 PM

## 2021-11-01 ENCOUNTER — TELEPHONE (OUTPATIENT)
Dept: SURGERY | Age: 66
End: 2021-11-01

## 2021-11-01 NOTE — TELEPHONE ENCOUNTER
MA received phone call from patient stating he is doing much better and is no longer taking the pain or muscle relaxer prescriptions. Patient states he does not feel the need to come in for appointment tomorrow. MA cancelled appointment for tomorrow. MA informed patient to call the office with any questions or concerns.     Electronically signed by Sarah Lubin on 11/1/21 at 3:48 PM EDT

## 2021-12-30 ENCOUNTER — OFFICE VISIT (OUTPATIENT)
Dept: NEUROSURGERY | Age: 66
End: 2021-12-30
Payer: MEDICARE

## 2021-12-30 ENCOUNTER — HOSPITAL ENCOUNTER (OUTPATIENT)
Dept: GENERAL RADIOLOGY | Age: 66
Discharge: HOME | End: 2022-01-01
Payer: MEDICARE

## 2021-12-30 ENCOUNTER — HOSPITAL ENCOUNTER (OUTPATIENT)
Age: 66
Discharge: HOME | End: 2022-01-01
Payer: MEDICARE

## 2021-12-30 VITALS
RESPIRATION RATE: 20 BRPM | HEIGHT: 72 IN | WEIGHT: 285 LBS | SYSTOLIC BLOOD PRESSURE: 142 MMHG | HEART RATE: 86 BPM | TEMPERATURE: 97 F | BODY MASS INDEX: 38.6 KG/M2 | DIASTOLIC BLOOD PRESSURE: 82 MMHG | OXYGEN SATURATION: 95 %

## 2021-12-30 DIAGNOSIS — S22.060D CLOSED WEDGE COMPRESSION FRACTURE OF T7 VERTEBRA WITH ROUTINE HEALING, SUBSEQUENT ENCOUNTER: ICD-10-CM

## 2021-12-30 DIAGNOSIS — S22.060D CLOSED WEDGE COMPRESSION FRACTURE OF T7 VERTEBRA WITH ROUTINE HEALING, SUBSEQUENT ENCOUNTER: Primary | ICD-10-CM

## 2021-12-30 PROCEDURE — 4040F PNEUMOC VAC/ADMIN/RCVD: CPT | Performed by: STUDENT IN AN ORGANIZED HEALTH CARE EDUCATION/TRAINING PROGRAM

## 2021-12-30 PROCEDURE — 99213 OFFICE O/P EST LOW 20 MIN: CPT | Performed by: STUDENT IN AN ORGANIZED HEALTH CARE EDUCATION/TRAINING PROGRAM

## 2021-12-30 PROCEDURE — G8427 DOCREV CUR MEDS BY ELIG CLIN: HCPCS | Performed by: STUDENT IN AN ORGANIZED HEALTH CARE EDUCATION/TRAINING PROGRAM

## 2021-12-30 PROCEDURE — 3017F COLORECTAL CA SCREEN DOC REV: CPT | Performed by: STUDENT IN AN ORGANIZED HEALTH CARE EDUCATION/TRAINING PROGRAM

## 2021-12-30 PROCEDURE — G8417 CALC BMI ABV UP PARAM F/U: HCPCS | Performed by: STUDENT IN AN ORGANIZED HEALTH CARE EDUCATION/TRAINING PROGRAM

## 2021-12-30 PROCEDURE — G8484 FLU IMMUNIZE NO ADMIN: HCPCS | Performed by: STUDENT IN AN ORGANIZED HEALTH CARE EDUCATION/TRAINING PROGRAM

## 2021-12-30 PROCEDURE — 72072 X-RAY EXAM THORAC SPINE 3VWS: CPT

## 2021-12-30 PROCEDURE — 1123F ACP DISCUSS/DSCN MKR DOCD: CPT | Performed by: STUDENT IN AN ORGANIZED HEALTH CARE EDUCATION/TRAINING PROGRAM

## 2021-12-30 PROCEDURE — 1036F TOBACCO NON-USER: CPT | Performed by: STUDENT IN AN ORGANIZED HEALTH CARE EDUCATION/TRAINING PROGRAM

## 2022-02-10 ENCOUNTER — TELEPHONE (OUTPATIENT)
Dept: PRIMARY CARE CLINIC | Age: 67
End: 2022-02-10

## 2022-02-21 ENCOUNTER — OFFICE VISIT (OUTPATIENT)
Dept: PRIMARY CARE CLINIC | Age: 67
End: 2022-02-21
Payer: MEDICARE

## 2022-02-21 VITALS
TEMPERATURE: 97.6 F | HEIGHT: 72 IN | HEART RATE: 78 BPM | DIASTOLIC BLOOD PRESSURE: 72 MMHG | SYSTOLIC BLOOD PRESSURE: 138 MMHG | BODY MASS INDEX: 41.04 KG/M2 | OXYGEN SATURATION: 98 % | WEIGHT: 303 LBS

## 2022-02-21 DIAGNOSIS — R53.83 FATIGUE, UNSPECIFIED TYPE: ICD-10-CM

## 2022-02-21 DIAGNOSIS — I10 ESSENTIAL HYPERTENSION: ICD-10-CM

## 2022-02-21 DIAGNOSIS — Z12.5 SCREENING FOR MALIGNANT NEOPLASM OF PROSTATE: ICD-10-CM

## 2022-02-21 DIAGNOSIS — Z12.11 SCREEN FOR COLON CANCER: ICD-10-CM

## 2022-02-21 DIAGNOSIS — Z91.81 AT HIGH RISK FOR FALLS: Primary | ICD-10-CM

## 2022-02-21 DIAGNOSIS — Z91.81 HISTORY OF BAD FALL: ICD-10-CM

## 2022-02-21 LAB
ALBUMIN SERPL-MCNC: 4.3 G/DL (ref 3.5–5.2)
ALP BLD-CCNC: 86 U/L (ref 40–129)
ALT SERPL-CCNC: 15 U/L (ref 0–40)
ANION GAP SERPL CALCULATED.3IONS-SCNC: 14 MMOL/L (ref 7–16)
AST SERPL-CCNC: 22 U/L (ref 0–39)
BILIRUB SERPL-MCNC: 0.4 MG/DL (ref 0–1.2)
BUN BLDV-MCNC: 17 MG/DL (ref 6–23)
CALCIUM SERPL-MCNC: 9.2 MG/DL (ref 8.6–10.2)
CHLORIDE BLD-SCNC: 105 MMOL/L (ref 98–107)
CHOLESTEROL, TOTAL: 215 MG/DL (ref 0–199)
CO2: 22 MMOL/L (ref 22–29)
CREAT SERPL-MCNC: 1.1 MG/DL (ref 0.7–1.2)
FOLATE: 14.1 NG/ML (ref 4.8–24.2)
GFR AFRICAN AMERICAN: >60
GFR NON-AFRICAN AMERICAN: >60 ML/MIN/1.73
GLUCOSE BLD-MCNC: 104 MG/DL (ref 74–99)
HCT VFR BLD CALC: 48 % (ref 37–54)
HDLC SERPL-MCNC: 45 MG/DL
HEMOGLOBIN: 15.8 G/DL (ref 12.5–16.5)
LDL CHOLESTEROL CALCULATED: 134 MG/DL (ref 0–99)
MCH RBC QN AUTO: 30.9 PG (ref 26–35)
MCHC RBC AUTO-ENTMCNC: 32.9 % (ref 32–34.5)
MCV RBC AUTO: 93.9 FL (ref 80–99.9)
PDW BLD-RTO: 12.9 FL (ref 11.5–15)
PLATELET # BLD: 161 E9/L (ref 130–450)
PMV BLD AUTO: 10.2 FL (ref 7–12)
POTASSIUM SERPL-SCNC: 4.4 MMOL/L (ref 3.5–5)
PROSTATE SPECIFIC ANTIGEN: 1.65 NG/ML (ref 0–4)
RBC # BLD: 5.11 E12/L (ref 3.8–5.8)
SODIUM BLD-SCNC: 141 MMOL/L (ref 132–146)
TOTAL PROTEIN: 7.1 G/DL (ref 6.4–8.3)
TRIGL SERPL-MCNC: 179 MG/DL (ref 0–149)
TSH SERPL DL<=0.05 MIU/L-ACNC: 3.02 UIU/ML (ref 0.27–4.2)
VITAMIN B-12: 531 PG/ML (ref 211–946)
VLDLC SERPL CALC-MCNC: 36 MG/DL
WBC # BLD: 4.2 E9/L (ref 4.5–11.5)

## 2022-02-21 PROCEDURE — 3017F COLORECTAL CA SCREEN DOC REV: CPT | Performed by: FAMILY MEDICINE

## 2022-02-21 PROCEDURE — G8427 DOCREV CUR MEDS BY ELIG CLIN: HCPCS | Performed by: FAMILY MEDICINE

## 2022-02-21 PROCEDURE — 4040F PNEUMOC VAC/ADMIN/RCVD: CPT | Performed by: FAMILY MEDICINE

## 2022-02-21 PROCEDURE — G8417 CALC BMI ABV UP PARAM F/U: HCPCS | Performed by: FAMILY MEDICINE

## 2022-02-21 PROCEDURE — 99204 OFFICE O/P NEW MOD 45 MIN: CPT | Performed by: FAMILY MEDICINE

## 2022-02-21 PROCEDURE — G8484 FLU IMMUNIZE NO ADMIN: HCPCS | Performed by: FAMILY MEDICINE

## 2022-02-21 PROCEDURE — 1123F ACP DISCUSS/DSCN MKR DOCD: CPT | Performed by: FAMILY MEDICINE

## 2022-02-21 PROCEDURE — 1036F TOBACCO NON-USER: CPT | Performed by: FAMILY MEDICINE

## 2022-02-21 ASSESSMENT — ENCOUNTER SYMPTOMS
CHEST TIGHTNESS: 0
SORE THROAT: 0
VISUAL CHANGE: 0
BOWEL INCONTINENCE: 0
ORTHOPNEA: 0
WHEEZING: 0
VOMITING: 0
SINUS PRESSURE: 0
EYE PAIN: 0
NAUSEA: 0
APNEA: 0
EYE ITCHING: 0
COLOR CHANGE: 0
BLOOD IN STOOL: 0
RHINORRHEA: 0
SHORTNESS OF BREATH: 0
ABDOMINAL PAIN: 0
DIARRHEA: 0
EYE REDNESS: 0
BACK PAIN: 0
CONSTIPATION: 0
COUGH: 0

## 2022-02-21 ASSESSMENT — PATIENT HEALTH QUESTIONNAIRE - PHQ9
SUM OF ALL RESPONSES TO PHQ QUESTIONS 1-9: 0
1. LITTLE INTEREST OR PLEASURE IN DOING THINGS: 0
SUM OF ALL RESPONSES TO PHQ QUESTIONS 1-9: 0
2. FEELING DOWN, DEPRESSED OR HOPELESS: 0
SUM OF ALL RESPONSES TO PHQ9 QUESTIONS 1 & 2: 0

## 2022-02-21 NOTE — PROGRESS NOTES
Chief Complaint:     Chief Complaint   Patient presents with    New Patient    Knee Pain     Both knees. Left knee more. Ibuprofen little relief     Numbness     in toes. Knee Pain   The incident occurred more than 1 week ago. The incident occurred at home. The injury mechanism was a fall. The pain is present in the left knee. The quality of the pain is described as aching. The pain is moderate. The pain has been intermittent since onset. Associated symptoms include an inability to bear weight and muscle weakness. Pertinent negatives include no loss of motion, loss of sensation, numbness or tingling. He reports no foreign bodies present. The symptoms are aggravated by movement, palpation and weight bearing. He has tried nothing for the symptoms. Fall  The accident occurred more than 1 week ago (September 2021). The fall occurred from a ladder. He landed on hard floor. The point of impact was the head. The pain is present in the head. The patient is experiencing no pain. Pertinent negatives include no abdominal pain, bowel incontinence, fever, headaches, hematuria, loss of consciousness, nausea, numbness, tingling, visual change or vomiting. He has tried nothing for the symptoms. The treatment provided no relief. Other  This is a recurrent problem. The current episode started more than 1 month ago. The problem occurs intermittently. The problem has been waxing and waning. Pertinent negatives include no abdominal pain, arthralgias, chest pain, chills, congestion, coughing, diaphoresis, fatigue, fever, headaches, myalgias, nausea, neck pain, numbness, rash, sore throat, visual change, vomiting or weakness. Nothing aggravates the symptoms. He has tried nothing for the symptoms. Hypertension  This is a new problem. The current episode started more than 1 month ago. The problem has been waxing and waning since onset. The problem is uncontrolled.  Pertinent negatives include no chest pain, headaches, malaise/fatigue, neck pain, orthopnea, palpitations or shortness of breath. There are no known risk factors for coronary artery disease. Past treatments include nothing. The current treatment provides no improvement. There are no compliance problems. There is no history of CAD/MI, CVA or PVD. There is no history of a hypertension causing med, pheochromocytoma, renovascular disease, sleep apnea or a thyroid problem. Patient Active Problem List   Diagnosis    Fall (on) (from) unspecified stairs and steps, initial encounter    Kidney laceration, right    Multiple closed fractures of ribs of both sides    Closed T7 fracture ([facility] [address]       History reviewed. No pertinent past medical history. History reviewed. No pertinent surgical history. Current Outpatient Medications   Medication Sig Dispense Refill    ibuprofen (ADVIL;MOTRIN) 600 MG tablet Take 1 tablet by mouth 3 times daily 120 tablet 1     No current facility-administered medications for this visit. Allergies   Allergen Reactions    Fish-Derived Products Anaphylaxis       Social History     Socioeconomic History    Marital status: Single     Spouse name: None    Number of children: None    Years of education: None    Highest education level: None   Occupational History    None   Tobacco Use    Smoking status: Never Smoker    Smokeless tobacco: Never Used   Vaping Use    Vaping Use: Never used   Substance and Sexual Activity    Alcohol use: Never    Drug use: Never    Sexual activity: None   Other Topics Concern    None   Social History Narrative    None     Social Determinants of Health     Financial Resource Strain:     Difficulty of Paying Living Expenses: Not on file   Food Insecurity:     Worried About Running Out of Food in the Last Year: Not on file    Chayo of Food in the Last Year: Not on file   Transportation Needs:     Lack of Transportation (Medical): Not on file    Lack of Transportation (Non-Medical):  Not on file   Physical Activity:     Days of Exercise per Week: Not on file    Minutes of Exercise per Session: Not on file   Stress:     Feeling of Stress : Not on file   Social Connections:     Frequency of Communication with Friends and Family: Not on file    Frequency of Social Gatherings with Friends and Family: Not on file    Attends Zoroastrian Services: Not on file    Active Member of [address] [place] [address] or Organizations: Not on file    Attends Club or Organization Meetings: Not on file    Marital Status: Not on file   Intimate Partner Violence:     Fear of Current or Ex-Partner: Not on file    Emotionally Abused: Not on file    Physically Abused: Not on file    Sexually Abused: Not on file   Housing Stability:     Unable to Pay for Housing in the Last Year: Not on file    Number of [place] in the Last Year: Not on file    Unstable Housing in the Last Year: Not on file       History reviewed. No pertinent family history. Review of Systems   Constitutional: Negative for activity change, appetite change, chills, diaphoresis, fatigue, fever and malaise/fatigue. HENT: Negative for congestion, ear pain, hearing loss, nosebleeds, rhinorrhea, sinus pressure and sore throat. Eyes: Negative for pain, redness, itching and visual disturbance. Respiratory: Negative for apnea, cough, chest tightness, shortness of breath and wheezing. Cardiovascular: Negative for chest pain, palpitations, orthopnea and leg swelling. Gastrointestinal: Negative for abdominal pain, blood in stool, bowel incontinence, constipation, diarrhea, nausea and vomiting. Endocrine: Negative. Genitourinary: Negative for decreased urine volume, difficulty urinating, dysuria, frequency, hematuria and urgency. Musculoskeletal: Negative for arthralgias, back pain, gait problem, myalgias and neck pain. Skin: Negative for color change and rash. Allergic/Immunologic: Negative for environmental allergies and food allergies. Neurological: Negative for dizziness, tingling, loss of consciousness, weakness, light-headedness, numbness and headaches. Hematological: Negative for adenopathy. Does not bruise/bleed easily. Psychiatric/Behavioral: Negative for behavioral problems, dysphoric mood and sleep disturbance. The patient is not nervous/anxious and is not hyperactive. All other systems reviewed and are negative. /72   Pulse 78   Temp 97.6 °F (36.4 °C)   Ht 6' (1.829 m)   Wt (!) 303 lb (137.4 kg)   SpO2 98%   BMI 41.09 kg/m²     Physical Exam  Vitals and nursing note reviewed. Constitutional:       General: He is not in acute distress. Appearance: Normal appearance. He is well-developed. HENT:      Head: Normocephalic and atraumatic. Right Ear: Hearing, tympanic membrane and external ear normal. No tenderness. No middle ear effusion. Left Ear: Hearing, tympanic membrane and external ear normal. No tenderness. No middle ear effusion. Nose: Nose normal. No congestion or rhinorrhea. Right Turbinates: Not enlarged. Left Turbinates: Not enlarged. Mouth/Throat:      Mouth: Mucous membranes are moist.      Tongue: No lesions. Pharynx: Oropharynx is clear. No oropharyngeal exudate or posterior oropharyngeal erythema. Eyes:      General: No scleral icterus. Conjunctiva/sclera: Conjunctivae normal.      Pupils: Pupils are equal, round, and reactive to light. Neck:      Thyroid: No thyromegaly. Cardiovascular:      Rate and Rhythm: Normal rate and regular rhythm. Heart sounds: Normal heart sounds. No murmur heard. Pulmonary:      Effort: Pulmonary effort is normal. No respiratory distress. Breath sounds: Normal breath sounds. No wheezing or rales. Abdominal:      General: Bowel sounds are normal. There is no distension. Palpations: Abdomen is soft. Tenderness: There is no abdominal tenderness. Musculoskeletal:         General: No tenderness. Normal range of motion. Cervical back: Normal range of motion and neck supple. No rigidity. No muscular tenderness. Lymphadenopathy:      Cervical: No cervical adenopathy. Skin:     General: Skin is warm and dry. Findings: No erythema or rash. Neurological:      General: No focal deficit present. Mental Status: He is alert and oriented to person, place, and time. Cranial Nerves: No cranial nerve deficit. Deep Tendon Reflexes: Reflexes are normal and symmetric. Reflexes normal.   Psychiatric:         Mood and Affect: Mood normal.                                 ASSESSMENT/PLAN:    Patient Active Problem List   Diagnosis    Fall (on) (from) unspecified stairs and steps, initial encounter    Kidney laceration, right    Multiple closed fractures of ribs of both sides    Closed T7 fracture ([facility] [address]       Ruth Park was seen today for new patient, knee pain and numbness. Diagnoses and all orders for this visit:    At high risk for falls    Essential hypertension  -     CBC; Future  -     Comprehensive Metabolic Panel; Future  -     Lipid Panel; Future  -     TSH; Future    Fatigue, unspecified type  -     TSH; Future  -     Vitamin B12 & Folate; Future  -     Testosterone, free, total; Future    History of bad fall    Screening for malignant neoplasm of prostate  -     PSA Screening; Future    Screen for colon cancer  -     POCT Fecal Immunochemical Test (FIT); Future          Return in about 2 months (around 4/21/2022) for Medicare AW. I spent 45 minutes with this patient. I spent greater than 50% of the time counseling this patient. Hong Shetty DO  2/21/2022  9:38 AM    On the basis of positive falls risk screening, assessment and plan is as follows: patient declines any further evaluation/treatment for increased falls risk.

## 2022-02-23 DIAGNOSIS — R79.89 LOW TESTOSTERONE: Primary | ICD-10-CM

## 2022-02-23 LAB
SEX HORMONE BINDING GLOBULIN: 39 NMOL/L (ref 11–80)
TESTOSTERONE FREE-NONMALE: 42.3 PG/ML (ref 47–244)
TESTOSTERONE TOTAL: 243 NG/DL (ref 220–1000)

## 2022-03-01 DIAGNOSIS — Z12.11 SCREEN FOR COLON CANCER: ICD-10-CM

## 2022-03-01 LAB
CONTROL: NORMAL
HEMOCCULT STL QL: NEGATIVE

## 2022-04-04 ENCOUNTER — OFFICE VISIT (OUTPATIENT)
Dept: PRIMARY CARE CLINIC | Age: 67
End: 2022-04-04
Payer: MEDICARE

## 2022-04-04 VITALS
OXYGEN SATURATION: 95 % | TEMPERATURE: 97.2 F | WEIGHT: 300 LBS | SYSTOLIC BLOOD PRESSURE: 136 MMHG | HEIGHT: 72 IN | BODY MASS INDEX: 40.63 KG/M2 | DIASTOLIC BLOOD PRESSURE: 78 MMHG | HEART RATE: 59 BPM

## 2022-04-04 DIAGNOSIS — R00.2 PALPITATION: ICD-10-CM

## 2022-04-04 DIAGNOSIS — Z00.00 INITIAL MEDICARE ANNUAL WELLNESS VISIT: Primary | ICD-10-CM

## 2022-04-04 PROCEDURE — 1123F ACP DISCUSS/DSCN MKR DOCD: CPT | Performed by: FAMILY MEDICINE

## 2022-04-04 PROCEDURE — 3017F COLORECTAL CA SCREEN DOC REV: CPT | Performed by: FAMILY MEDICINE

## 2022-04-04 PROCEDURE — 99214 OFFICE O/P EST MOD 30 MIN: CPT | Performed by: FAMILY MEDICINE

## 2022-04-04 PROCEDURE — G0438 PPPS, INITIAL VISIT: HCPCS | Performed by: FAMILY MEDICINE

## 2022-04-04 PROCEDURE — G8417 CALC BMI ABV UP PARAM F/U: HCPCS | Performed by: FAMILY MEDICINE

## 2022-04-04 PROCEDURE — 4040F PNEUMOC VAC/ADMIN/RCVD: CPT | Performed by: FAMILY MEDICINE

## 2022-04-04 PROCEDURE — G8427 DOCREV CUR MEDS BY ELIG CLIN: HCPCS | Performed by: FAMILY MEDICINE

## 2022-04-04 PROCEDURE — 93000 ELECTROCARDIOGRAM COMPLETE: CPT | Performed by: FAMILY MEDICINE

## 2022-04-04 PROCEDURE — 1036F TOBACCO NON-USER: CPT | Performed by: FAMILY MEDICINE

## 2022-04-04 RX ORDER — VALACYCLOVIR HYDROCHLORIDE 1 G/1
1000 TABLET, FILM COATED ORAL 2 TIMES DAILY
Qty: 4 TABLET | Refills: 0 | Status: SHIPPED | OUTPATIENT
Start: 2022-04-04 | End: 2022-04-06

## 2022-04-04 RX ORDER — ACYCLOVIR 50 MG/G
OINTMENT TOPICAL
Qty: 15 G | Refills: 1 | Status: SHIPPED | OUTPATIENT
Start: 2022-04-04 | End: 2022-04-11

## 2022-04-04 ASSESSMENT — PATIENT HEALTH QUESTIONNAIRE - PHQ9
2. FEELING DOWN, DEPRESSED OR HOPELESS: 0
SUM OF ALL RESPONSES TO PHQ9 QUESTIONS 1 & 2: 0
SUM OF ALL RESPONSES TO PHQ QUESTIONS 1-9: 0
1. LITTLE INTEREST OR PLEASURE IN DOING THINGS: 0

## 2022-04-04 ASSESSMENT — ENCOUNTER SYMPTOMS
ABDOMINAL PAIN: 0
RHINORRHEA: 0
EYE REDNESS: 0
SORE THROAT: 0
SINUS PRESSURE: 0
NAUSEA: 0
BLOOD IN STOOL: 0
CHEST TIGHTNESS: 0
COUGH: 0
WHEEZING: 0
BACK PAIN: 0
CONSTIPATION: 0
SHORTNESS OF BREATH: 0
DIARRHEA: 0
COLOR CHANGE: 0
EYE ITCHING: 0
VOMITING: 0
EYE PAIN: 0
APNEA: 0

## 2022-04-04 ASSESSMENT — LIFESTYLE VARIABLES: HOW OFTEN DO YOU HAVE A DRINK CONTAINING ALCOHOL: NEVER

## 2022-04-04 NOTE — PATIENT INSTRUCTIONS
Personalized Preventive Plan for Olivia Cornejo - 4/4/2022  Medicare offers a range of preventive health benefits. Some of the tests and screenings are paid in full while other may be subject to a deductible, co-insurance, and/or copay. Some of these benefits include a comprehensive review of your medical history including lifestyle, illnesses that may run in your family, and various assessments and screenings as appropriate. After reviewing your medical record and screening and assessments performed today your provider may have ordered immunizations, labs, imaging, and/or referrals for you. A list of these orders (if applicable) as well as your Preventive Care list are included within your After Visit Summary for your review. Other Preventive Recommendations:    · A preventive eye exam performed by an eye specialist is recommended every 1-2 years to screen for glaucoma; cataracts, macular degeneration, and other eye disorders. · A preventive dental visit is recommended every 6 months. · Try to get at least 150 minutes of exercise per week or 10,000 steps per day on a pedometer . · Order or download the FREE \"Exercise & Physical Activity: Your Everyday Guide\" from [employer]. Call 3-119.959.9542 or search [employer] online. · You need 6584-1500 mg of calcium and 3399-7357 IU of vitamin D per day. It is possible to meet your calcium requirement with diet alone, but a vitamin D supplement is usually necessary to meet this goal.  · When exposed to the sun, use a sunscreen that protects against both UVA and UVB radiation with an SPF of 30 or greater. Reapply every 2 to 3 hours or after sweating, drying off with a towel, or swimming. · Always wear a seat belt when traveling in a car. Always wear a helmet when riding a bicycle or motorcycle.

## 2022-04-04 NOTE — PROGRESS NOTES
Chief Complaint:     Chief Complaint   Patient presents with    Medicare AWV         Palpitations   This is a recurrent problem. The current episode started more than 1 year ago. The problem occurs intermittently. The problem has been waxing and waning. Nothing aggravates the symptoms. Pertinent negatives include no anxiety, chest pain, coughing, diaphoresis, dizziness, fever, irregular heartbeat, malaise/fatigue, nausea, near-syncope, numbness, shortness of breath, vomiting or weakness. He has tried nothing for the symptoms. The treatment provided no relief. Risk factors include being male and obesity. Patient Active Problem List   Diagnosis    Fall (on) (from) unspecified stairs and steps, initial encounter    Kidney laceration, right    Multiple closed fractures of ribs of both sides    Closed T7 fracture ([facility] [address]       History reviewed. No pertinent past medical history. History reviewed. No pertinent surgical history. Current Outpatient Medications   Medication Sig Dispense Refill    valACYclovir (VALTREX) 1 g tablet Take 1 tablet by mouth 2 times daily for 2 days 4 tablet 0    acyclovir (ZOVIRAX) 5 % ointment Apply topically every 3 hours. 15 g 1     No current facility-administered medications for this visit.        Allergies   Allergen Reactions    Fish-Derived Products Anaphylaxis       Social History     Socioeconomic History    Marital status: Single     Spouse name: None    Number of children: None    Years of education: None    Highest education level: None   Occupational History    None   Tobacco Use    Smoking status: Never Smoker    Smokeless tobacco: Never Used   Vaping Use    Vaping Use: Never used   Substance and Sexual Activity    Alcohol use: Never    Drug use: Never    Sexual activity: None   Other Topics Concern    None   Social History Narrative    None     Social Determinants of Health     Financial Resource Strain:     Difficulty of Paying Living Expenses: Not on file   Food Insecurity:     Worried About [address] in the Last Year: Not on file    [place] of Food in the Last Year: Not on file   Transportation Needs:     Lack of Transportation (Medical): Not on file    Lack of Transportation (Non-Medical): Not on file   Physical Activity: Sufficiently Active    Days of Exercise per Week: 7 days    Minutes of Exercise per Session: 150+ min   Stress:     Feeling of Stress : Not on file   Social Connections:     Frequency of Communication with Friends and Family: Not on file    Frequency of Social Gatherings with Friends and Family: Not on file    Attends Mandaeism Services: Not on file    Active Member of Clubs or Organizations: Not on file    Attends Club or Organization Meetings: Not on file    Marital Status: Not on file   Intimate Partner Violence:     Fear of Current or Ex-Partner: Not on file    Emotionally Abused: Not on file    Physically Abused: Not on file    Sexually Abused: Not on file   Housing Stability:     Unable to Pay for Housing in the Last Year: Not on file    Number of [place] in the Last Year: Not on file    Unstable Housing in the Last Year: Not on file       History reviewed. No pertinent family history. Review of Systems   Constitutional: Negative for activity change, appetite change, diaphoresis, fatigue, fever and malaise/fatigue. HENT: Negative for congestion, ear pain, hearing loss, nosebleeds, rhinorrhea, sinus pressure and sore throat. Eyes: Negative for pain, redness, itching and visual disturbance. Respiratory: Negative for apnea, cough, chest tightness, shortness of breath and wheezing. Cardiovascular: Positive for palpitations. Negative for chest pain, leg swelling and near-syncope. Gastrointestinal: Negative for abdominal pain, blood in stool, constipation, diarrhea, nausea and vomiting. Endocrine: Negative.     Genitourinary: Negative for decreased urine volume, difficulty urinating, dysuria, frequency, hematuria and urgency. Musculoskeletal: Negative for arthralgias, back pain, gait problem, myalgias and neck pain. Skin: Negative for color change and rash. Allergic/Immunologic: Negative for environmental allergies and food allergies. Neurological: Negative for dizziness, weakness, light-headedness, numbness and headaches. Hematological: Negative for adenopathy. Does not bruise/bleed easily. Psychiatric/Behavioral: Negative for behavioral problems, dysphoric mood and sleep disturbance. The patient is not nervous/anxious and is not hyperactive. All other systems reviewed and are negative. /78   Pulse 59   Temp 97.2 °F (36.2 °C)   Ht 6' (1.829 m)   Wt 300 lb (136.1 kg)   SpO2 95%   BMI 40.69 kg/m²     Physical Exam  Vitals and nursing note reviewed. Constitutional:       General: He is not in acute distress. Appearance: Normal appearance. He is well-developed. HENT:      Head: Normocephalic and atraumatic. Right Ear: Hearing, tympanic membrane and external ear normal. No tenderness. No middle ear effusion. Left Ear: Hearing, tympanic membrane and external ear normal. No tenderness. No middle ear effusion. Nose: Nose normal. No congestion or rhinorrhea. Right Turbinates: Not enlarged. Left Turbinates: Not enlarged. Mouth/Throat:      Mouth: Mucous membranes are moist.      Tongue: No lesions. Pharynx: Oropharynx is clear. No oropharyngeal exudate or posterior oropharyngeal erythema. Eyes:      General: No scleral icterus. Conjunctiva/sclera: Conjunctivae normal.      Pupils: Pupils are equal, round, and reactive to light. Neck:      Thyroid: No thyromegaly. Cardiovascular:      Rate and Rhythm: Normal rate and regular rhythm. Heart sounds: Normal heart sounds. No murmur heard. Pulmonary:      Effort: Pulmonary effort is normal. No respiratory distress. Breath sounds: Normal breath sounds.  No wheezing or rales. Abdominal:      General: Bowel sounds are normal. There is no distension. Palpations: Abdomen is soft. Tenderness: There is no abdominal tenderness. Musculoskeletal:         General: No tenderness. Normal range of motion. Cervical back: Normal range of motion and neck supple. No rigidity. No muscular tenderness. Lymphadenopathy:      Cervical: No cervical adenopathy. Skin:     General: Skin is warm and dry. Findings: No erythema or rash. Neurological:      General: No focal deficit present. Mental Status: He is alert and oriented to person, place, and time. Cranial Nerves: No cranial nerve deficit. Deep Tendon Reflexes: Reflexes are normal and symmetric. Reflexes normal.   Psychiatric:         Mood and Affect: Mood normal.                                 ASSESSMENT/PLAN:    Patient Active Problem List   Diagnosis    Fall (on) (from) unspecified stairs and steps, initial encounter    Kidney laceration, right    Multiple closed fractures of ribs of both sides    Closed T7 fracture ([facility] [address]       Abraham Donald was seen today for medicare awv. Diagnoses and all orders for this visit:    Initial Medicare annual wellness visit    Palpitation  -     EKG 12 Lead    Other orders  -     valACYclovir (VALTREX) 1 g tablet; Take 1 tablet by mouth 2 times daily for 2 days  -     acyclovir (ZOVIRAX) 5 % ointment; Apply topically every 3 hours. Return for Medicare Annual Wellness Visit in 1 year. I spent 45 minutes with this patient. I spent greater than 50% of the time counseling this patient.         Hemalatha Estrada DO  4/4/2022  9:40 AM

## 2022-04-04 NOTE — PROGRESS NOTES
Medicare Annual Wellness Visit    Jace Frank is here for Medicare AWV    Assessment & Plan   Initial Medicare annual wellness visit  Palpitation  -     EKG 12 Lead      Recommendations for Preventive Services Due: see orders and patient instructions/AVS.  Recommended screening schedule for the next 5-10 years is provided to the patient in written form: see Patient Instructions/AVS.     Return for Medicare Annual Wellness Visit in 1 year. Subjective   The following acute and/or chronic problems were also addressed today: cold sore    Patient's complete Health Risk Assessment and screening values have been reviewed and are found in Flowsheets. The following problems were reviewed today and where indicated follow up appointments were made and/or referrals ordered.     Positive Risk Factor Screenings with Interventions:    Fall Risk:  Do you feel unsteady or are you worried about falling? : (!) yes  2 or more falls in past year?: no  Fall with injury in past year?: no     Fall Risk Interventions:    · Home safety tips provided              General Health and ACP:  General  In general, how would you say your health is?: Very Good  In the past 7 days, have you experienced any of the following: New or Increased Pain, New or Increased Fatigue, Loneliness, Social Isolation, Stress or Anger?: No  Do you get the social and emotional support that you need?: Yes  Do you have a Living Will?: Yes    Advance Directives     Power of  Living Will ACP-Advance Directive ACP-Power of     Not on File Not on File Not on File Not on File      General Health Risk Interventions:  · n/a    Health Habits/Nutrition:     Physical Activity: Sufficiently Active    Days of Exercise per Week: 7 days    Minutes of Exercise per Session: 150+ min     Have you lost any weight without trying in the past 3 months?: No  Body mass index: (!) 40.68  Have you seen the dentist within the past year?: Yes    Health Habits/Nutrition Interventions:  · Nutritional issues:  patient is not ready to address his/her nutritional/weight issues at this time    Hearing/Vision:  Do you or your family notice any trouble with your hearing that hasn't been managed with hearing aids?: No  Do you have difficulty driving, watching TV, or doing any of your daily activities because of your eyesight?: No  Have you had an eye exam within the past year?: (!) No  No exam data present    Hearing/Vision Interventions:  · Vision concerns:  patient encouraged to make appointment with his/her eye specialist    Safety:  Do you have working smoke detectors?: Yes  Do you have any tripping hazards - loose or unsecured carpets or rugs?: No  Do you have any tripping hazards - clutter in doorways, halls, or stairs?: No  Do you have either shower bars, grab bars, non-slip mats or non-slip surfaces in your shower or bathtub?: (!) No  Do all of your stairways have a railing or banister?: (!) No  Do you always fasten your seatbelt when you are in a car?: Yes    Safety Interventions:  · Home safety tips provided           Objective   Vitals:    04/04/22 0906 04/04/22 0931   BP: (!) 140/78 136/78   Pulse: 59    Temp: 97.2 °F (36.2 °C)    SpO2: 95%    Weight: 300 lb (136.1 kg)    Height: 6' (1.829 m)       Body mass index is 40.69 kg/m².         General Appearance: alert and oriented to person, place and time, well developed and well- nourished, in no acute distress  Skin: warm and dry, no rash or erythema  Head: normocephalic and atraumatic  Eyes: pupils equal, round, and reactive to light, extraocular eye movements intact, conjunctivae normal  ENT: tympanic membrane, external ear and ear canal normal bilaterally, nose without deformity, nasal mucosa and turbinates normal without polyps  Neck: supple and non-tender without mass, no thyromegaly or thyroid nodules, no cervical lymphadenopathy  Pulmonary/Chest: clear to auscultation bilaterally- no wheezes, rales or rhonchi, normal air movement, no respiratory distress  Cardiovascular: normal rate, regular rhythm, normal S1 and S2, no murmurs, rubs, clicks, or gallops, distal pulses intact, no carotid bruits  Abdomen: soft, non-tender, non-distended, normal bowel sounds, no masses or organomegaly  Extremities: no cyanosis, clubbing or edema  Musculoskeletal: normal range of motion, no joint swelling, deformity or tenderness  Neurologic: reflexes normal and symmetric, no cranial nerve deficit, gait, coordination and speech normal       Allergies   Allergen Reactions    Fish-Derived Products Anaphylaxis     Prior to Visit Medications    Medication Sig Taking? Authorizing Provider   valACYclovir (VALTREX) 1 g tablet Take 1 tablet by mouth 2 times daily for 2 days Yes Elijah Pickard DO   acyclovir (ZOVIRAX) 5 % ointment Apply topically every 3 hours.  Yes DO Sidney Vanegas (Including outside providers/suppliers regularly involved in providing care):   Patient Care Team:  Laure Herrmann DO as PCP - General (Family Medicine)  Laure Herrmann DO as PCP - [facility] Empaneled Provider    Reviewed and updated this visit:  Tobacco  Allergies  Meds  Problems  Med Hx  Surg Hx  Soc Hx  Fam Hx

## 2022-10-03 ENCOUNTER — OFFICE VISIT (OUTPATIENT)
Dept: PRIMARY CARE CLINIC | Age: 67
End: 2022-10-03
Payer: MEDICARE

## 2022-10-03 VITALS
WEIGHT: 299 LBS | BODY MASS INDEX: 40.5 KG/M2 | OXYGEN SATURATION: 98 % | SYSTOLIC BLOOD PRESSURE: 128 MMHG | HEART RATE: 59 BPM | HEIGHT: 72 IN | DIASTOLIC BLOOD PRESSURE: 74 MMHG | TEMPERATURE: 97.9 F

## 2022-10-03 DIAGNOSIS — E78.2 MIXED HYPERLIPIDEMIA: ICD-10-CM

## 2022-10-03 DIAGNOSIS — R20.2 PARESTHESIA OF BOTH FEET: ICD-10-CM

## 2022-10-03 DIAGNOSIS — M17.0 PRIMARY OSTEOARTHRITIS OF BOTH KNEES: ICD-10-CM

## 2022-10-03 DIAGNOSIS — M17.0 PRIMARY OSTEOARTHRITIS OF BOTH KNEES: Primary | ICD-10-CM

## 2022-10-03 PROBLEM — E78.5 HYPERLIPIDEMIA: Status: ACTIVE | Noted: 2022-10-03

## 2022-10-03 LAB
ALBUMIN SERPL-MCNC: 4.2 G/DL (ref 3.5–5.2)
ALP BLD-CCNC: 77 U/L (ref 40–129)
ALT SERPL-CCNC: 18 U/L (ref 0–40)
ANION GAP SERPL CALCULATED.3IONS-SCNC: 6 MMOL/L (ref 7–16)
AST SERPL-CCNC: 23 U/L (ref 0–39)
BILIRUB SERPL-MCNC: 0.4 MG/DL (ref 0–1.2)
BUN BLDV-MCNC: 14 MG/DL (ref 6–23)
CALCIUM SERPL-MCNC: 9.1 MG/DL (ref 8.6–10.2)
CHLORIDE BLD-SCNC: 107 MMOL/L (ref 98–107)
CHOLESTEROL, TOTAL: 180 MG/DL (ref 0–199)
CO2: 26 MMOL/L (ref 22–29)
CREAT SERPL-MCNC: 1 MG/DL (ref 0.7–1.2)
GFR AFRICAN AMERICAN: >60
GFR NON-AFRICAN AMERICAN: >60 ML/MIN/1.73
GLUCOSE BLD-MCNC: 103 MG/DL (ref 74–99)
HCT VFR BLD CALC: 44.7 % (ref 37–54)
HDLC SERPL-MCNC: 43 MG/DL
HEMOGLOBIN: 14.3 G/DL (ref 12.5–16.5)
LDL CHOLESTEROL CALCULATED: 109 MG/DL (ref 0–99)
MCH RBC QN AUTO: 30 PG (ref 26–35)
MCHC RBC AUTO-ENTMCNC: 32 % (ref 32–34.5)
MCV RBC AUTO: 93.9 FL (ref 80–99.9)
PDW BLD-RTO: 13.7 FL (ref 11.5–15)
PLATELET # BLD: 172 E9/L (ref 130–450)
PMV BLD AUTO: 10.2 FL (ref 7–12)
POTASSIUM SERPL-SCNC: 4.3 MMOL/L (ref 3.5–5)
RBC # BLD: 4.76 E12/L (ref 3.8–5.8)
SODIUM BLD-SCNC: 139 MMOL/L (ref 132–146)
TOTAL PROTEIN: 6.9 G/DL (ref 6.4–8.3)
TRIGL SERPL-MCNC: 142 MG/DL (ref 0–149)
TSH SERPL DL<=0.05 MIU/L-ACNC: 2.96 UIU/ML (ref 0.27–4.2)
VLDLC SERPL CALC-MCNC: 28 MG/DL
WBC # BLD: 4.7 E9/L (ref 4.5–11.5)

## 2022-10-03 PROCEDURE — 99214 OFFICE O/P EST MOD 30 MIN: CPT | Performed by: FAMILY MEDICINE

## 2022-10-03 PROCEDURE — 1123F ACP DISCUSS/DSCN MKR DOCD: CPT | Performed by: FAMILY MEDICINE

## 2022-10-03 PROCEDURE — 3017F COLORECTAL CA SCREEN DOC REV: CPT | Performed by: FAMILY MEDICINE

## 2022-10-03 PROCEDURE — G8484 FLU IMMUNIZE NO ADMIN: HCPCS | Performed by: FAMILY MEDICINE

## 2022-10-03 PROCEDURE — G8427 DOCREV CUR MEDS BY ELIG CLIN: HCPCS | Performed by: FAMILY MEDICINE

## 2022-10-03 PROCEDURE — G8417 CALC BMI ABV UP PARAM F/U: HCPCS | Performed by: FAMILY MEDICINE

## 2022-10-03 PROCEDURE — 1036F TOBACCO NON-USER: CPT | Performed by: FAMILY MEDICINE

## 2022-10-03 ASSESSMENT — PATIENT HEALTH QUESTIONNAIRE - PHQ9
SUM OF ALL RESPONSES TO PHQ QUESTIONS 1-9: 0
SUM OF ALL RESPONSES TO PHQ9 QUESTIONS 1 & 2: 0
2. FEELING DOWN, DEPRESSED OR HOPELESS: 0
1. LITTLE INTEREST OR PLEASURE IN DOING THINGS: 0
SUM OF ALL RESPONSES TO PHQ QUESTIONS 1-9: 0

## 2022-10-03 ASSESSMENT — ENCOUNTER SYMPTOMS
WHEEZING: 0
SHORTNESS OF BREATH: 0
COUGH: 0
COLOR CHANGE: 0
CHEST TIGHTNESS: 0
APNEA: 0
RHINORRHEA: 0
BLOOD IN STOOL: 0
BACK PAIN: 0
EYE PAIN: 0
SINUS PRESSURE: 0
EYE ITCHING: 0
NAUSEA: 0
VOMITING: 0
EYE REDNESS: 0
DIARRHEA: 0
SORE THROAT: 0
ABDOMINAL PAIN: 0
CONSTIPATION: 0

## 2022-10-03 NOTE — PROGRESS NOTES
Chief Complaint:     Chief Complaint   Patient presents with    6 Month Follow-Up         Knee Pain   The incident occurred more than 1 week ago. The incident occurred at home. There was no injury mechanism. The pain is present in the left knee and right knee. The quality of the pain is described as aching. The pain is moderate. Associated symptoms include an inability to bear weight, a loss of motion, muscle weakness, numbness and tingling. The symptoms are aggravated by movement, palpation and weight bearing. He has tried nothing for the symptoms. The treatment provided no relief. Hyperlipidemia  This is a recurrent problem. The current episode started more than 1 month ago. Recent lipid tests were reviewed and are variable. Exacerbating diseases include obesity. There are no known factors aggravating his hyperlipidemia. Pertinent negatives include no chest pain, myalgias or shortness of breath. He is currently on no antihyperlipidemic treatment. The current treatment provides no improvement of lipids. There are no compliance problems. Foot Pain   The pain is present in the left foot and right foot. This is a chronic problem. The current episode started more than 1 month ago. The problem occurs daily. The problem has been unchanged. The quality of the pain is described as aching and burning. Associated symptoms include an inability to bear weight, a limited range of motion, numbness, stiffness and tingling. Pertinent negatives include no fever. The symptoms are aggravated by standing. He has tried nothing for the symptoms. Patient Active Problem List   Diagnosis    Fall (on) (from) unspecified stairs and steps, initial encounter    Kidney laceration, right    Multiple closed fractures of ribs of both sides    Closed T7 fracture ([facility])    Paresthesia of both feet    Hyperlipidemia    Primary osteoarthritis of both knees       History reviewed. No pertinent past medical history. History reviewed.  No pertinent surgical history. No current outpatient medications on file. No current facility-administered medications for this visit. Allergies   Allergen Reactions    Fish-Derived Products Anaphylaxis       Social History     Socioeconomic History    Marital status: Single     Spouse name: None    Number of children: None    Years of education: None    Highest education level: None   Tobacco Use    Smoking status: Never    Smokeless tobacco: Never   Vaping Use    Vaping Use: Never used   Substance and Sexual Activity    Alcohol use: Never    Drug use: Never     Social Determinants of Health     Physical Activity: Sufficiently Active    Days of Exercise per Week: 7 days    Minutes of Exercise per Session: 150+ min       History reviewed. No pertinent family history. Review of Systems   Constitutional:  Negative for activity change, appetite change, fatigue and fever. HENT:  Negative for congestion, ear pain, hearing loss, nosebleeds, rhinorrhea, sinus pressure and sore throat. Eyes:  Negative for pain, redness, itching and visual disturbance. Respiratory:  Negative for apnea, cough, chest tightness, shortness of breath and wheezing. Cardiovascular:  Negative for chest pain, palpitations and leg swelling. Gastrointestinal:  Negative for abdominal pain, blood in stool, constipation, diarrhea, nausea and vomiting. Endocrine: Negative. Genitourinary:  Negative for decreased urine volume, difficulty urinating, dysuria, frequency, hematuria and urgency. Musculoskeletal:  Positive for stiffness. Negative for arthralgias, back pain, gait problem, myalgias and neck pain. Skin:  Negative for color change and rash. Allergic/Immunologic: Negative for environmental allergies and food allergies. Neurological:  Positive for tingling and numbness. Negative for dizziness, weakness, light-headedness and headaches. Hematological:  Negative for adenopathy. Does not bruise/bleed easily. Psychiatric/Behavioral:  Negative for behavioral problems, dysphoric mood and sleep disturbance. The patient is not nervous/anxious and is not hyperactive. All other systems reviewed and are negative. /74   Pulse 59   Temp 97.9 °F (36.6 °C)   Ht 6' (1.829 m)   Wt 299 lb (135.6 kg)   SpO2 98%   BMI 40.55 kg/m²     Physical Exam  Vitals and nursing note reviewed. Constitutional:       General: He is not in acute distress. Appearance: Normal appearance. He is well-developed. HENT:      Head: Normocephalic and atraumatic. Right Ear: Hearing, tympanic membrane and external ear normal. No tenderness. No middle ear effusion. Left Ear: Hearing, tympanic membrane and external ear normal. No tenderness. No middle ear effusion. Nose: Nose normal. No congestion or rhinorrhea. Right Turbinates: Not enlarged. Left Turbinates: Not enlarged. Mouth/Throat:      Mouth: Mucous membranes are moist.      Tongue: No lesions. Pharynx: Oropharynx is clear. No oropharyngeal exudate or posterior oropharyngeal erythema. Eyes:      General: No scleral icterus. Conjunctiva/sclera: Conjunctivae normal.      Pupils: Pupils are equal, round, and reactive to light. Neck:      Thyroid: No thyromegaly. Cardiovascular:      Rate and Rhythm: Normal rate and regular rhythm. Heart sounds: Normal heart sounds. No murmur heard. Pulmonary:      Effort: Pulmonary effort is normal. No respiratory distress. Breath sounds: Normal breath sounds. No wheezing or rales. Abdominal:      General: Bowel sounds are normal. There is no distension. Palpations: Abdomen is soft. Tenderness: There is no abdominal tenderness. Musculoskeletal:         General: No tenderness. Cervical back: Normal range of motion and neck supple. No rigidity. No muscular tenderness. Lymphadenopathy:      Cervical: No cervical adenopathy. Skin:     General: Skin is warm and dry. Findings: No erythema or rash. Neurological:      General: No focal deficit present. Mental Status: He is alert and oriented to person, place, and time. Cranial Nerves: No cranial nerve deficit. Deep Tendon Reflexes: Reflexes are normal and symmetric. Reflexes normal.   Psychiatric:         Mood and Affect: Mood normal.                               ASSESSMENT/PLAN:    Patient Active Problem List   Diagnosis    Fall (on) (from) unspecified stairs and steps, initial encounter    Kidney laceration, right    Multiple closed fractures of ribs of both sides    Closed T7 fracture (HCC)    Paresthesia of both feet    Hyperlipidemia    Primary osteoarthritis of both knees       Jn Zayasashwin was seen today for 6 month follow-up. Diagnoses and all orders for this visit:    Paresthesia of both feet  -     EMG; Future    Mixed hyperlipidemia  -     CBC; Future  -     Comprehensive Metabolic Panel; Future  -     Lipid Panel; Future  -     TSH; Future    Primary osteoarthritis of both knees  -     XR KNEE LEFT (MIN 4 VIEWS); Future  -     XR KNEE RIGHT (MIN 4 VIEWS); Future        Return in about 27 weeks (around 4/10/2023) for Medicare AW. I spent 30 minutes with this patient. I spent greater than 50% of the time counseling this patient.         Tyler Morrell DO  10/3/2022  9:26 AM

## 2022-11-02 ENCOUNTER — HOSPITAL ENCOUNTER (OUTPATIENT)
Dept: NEUROLOGY | Age: 67
Discharge: HOME | End: 2022-11-02
Payer: MEDICARE

## 2022-11-02 VITALS — HEIGHT: 72 IN | BODY MASS INDEX: 39.14 KG/M2 | WEIGHT: 289 LBS

## 2022-11-02 DIAGNOSIS — R20.2 PARESTHESIA OF BOTH FEET: ICD-10-CM

## 2022-11-02 PROCEDURE — 95911 NRV CNDJ TEST 9-10 STUDIES: CPT

## 2022-11-02 PROCEDURE — 95886 MUSC TEST DONE W/N TEST COMP: CPT

## 2022-11-02 NOTE — PROCEDURES
[address], [address] [place] [address] Rd  Phone: [phone]  Fax: [phone]      Referring Provider: Samantha Ford DO  Primary Care Physician: Yane Robles DO  Patient Name: Fracisco Alarcon  Patient YOB: 1955  Gender: male  BMI: Body mass index is 39.2 kg/m². Height 6' (1.829 m), weight 289 lb (131.1 kg). 11/2/2022    Reason for referral: Paresthesia of both feet    Description of clinical problem:   Patient reports pain as well as numbness/tingling at the bottoms of both feet. He states he sometimes also has paresthesias in the lower part of both legs. He denies any significant low back pain or pain radiating from the low back. Pain: Yes   ; Numbness/tingling: Yes; Weakness: No       Brief physical exam:   Sensory deficit: No; Weakness: No; Atrophy: No; Reflex abnormality: No      Study Limitations:  None    Motor NCS      Nerve / Sites Lat. Lat Diff Amplitude Amp. 1-2 Distance Velocity Temp.    ms ms mV % cm m/s °C   R Peroneal - EDB      Ankle 4.38  5.1 100 8  32.6      Pop fossa 13.75 9.38 4.5 88.4 41 44 32.6   L Peroneal - EDB      Ankle 4.43  4.1 100 8  33.5      Pop fossa 13.75 9.32 4.1 99.6 41 44 33.5   R Tibial - AH      Ankle 3.54  5.3 100 8  33.1      Pop fossa 14.22 10.68 4.8 89.4 44 41 33.3   L Tibial - AH      Ankle 4.01  3.7 100 8  34      Pop fossa 14.58 10.57 1.8 48.5 44 42 34       Sensory NCS      Nerve / Sites Onset Lat Peak Lat PP Amp Distance Velocity Temp.    ms ms µV cm m/s °C   R Superficial peroneal - Ankle      Lat leg 2.40 3.44 8.7 10 42 32.9   L Superficial peroneal - Ankle      Lat leg 2.50 3.28 3.7 10 40 33.8   R Sural - Ankle (Calf)      Calf NR NR NR 14 NR 33   L Sural - Ankle (Calf)      Calf 3.28 3.91 5.7 14 43 33.9       F  Wave      Nerve F Lat M Lat F-M Lat    ms ms ms   R Peroneal - EDB 60.1 3.5 56.5   R Tibial - AH 58.9 4.0 54.9   L Peroneal - EDB 58.1 4.8 53.2   L Tibial - AH 57.9 3. 5 54.4       H Reflex      Nerve Lat Hmax    ms   R Tibial - Soleus 35.9   L Tibial - Soleus NR       EMG         EMG Summary Table     Spontaneous MUAP Recruitment   Muscle IA Fib PSW Fasc H.F. Amp Dur. PPP Pattern   L. Tibialis anterior N None None None None N N N N   L. Extensor hallucis longus N None None None None 1+ N N N   L. Gastrocnemius (Medial head) N None 1+ None None 1+ N N N   L. Vastus medialis N None None None None N N N N   L. Lumbar paraspinals (mid) N None None None None N N N N   L. Lumbar paraspinals (low) Incr 1+ 1+ None None N N N N   R. Tibialis anterior N None None None None N N 1+ N   R. Extensor hallucis longus N None None None None 1+ 1+ N N   R. Gastrocnemius (Medial head) N None None None None 1+ N N N   R. Vastus medialis N None None None None N N N N   R. Lumbar paraspinals (mid) N None None None None N N N N   R. Lumbar paraspinals (low) N None 1+ None None N N N N           Summary of Findings:   Nerve conduction studies: The following nerve conduction studies were abnormal:   ***  All other nerve conduction studies listed in the table above were normal in latency, amplitude and conduction velocity. Needle EMG:   Needle EMG was performed using a *** needle. The following abnormalities were seen on needle EMG: ***  All other muscles tested, as listed in the table above demonstrated normal amplitude, duration, phases and recruitment and no active denervation signs were seen. Diagnostic Interpretation: This study was {Normal/Abnormal:2536237364}. Electrodiagnosis: There is electrodiagnostic evidence of a***. Location: {RIGHT/LEFT/STEVE:25189} {SYMMETRIC/ASYMMETRIC:761732137}; {Desc; prox/mid/distal:72553}, at the ***.    Nature: [  ] Axonal   [  ] Demyelinating  [  ] Mixed axonal and demyelinating     [  ] Sensory [  ] Motor               [  ] Mixed sensorimotor     [  ] with active denervation       [  ] without active denervation  Duration: {Desc; acute/subacute/chronic:96811}  Severity: {MILD/MODERATE/SEVERE:98904}  Prognosis: {Prognosis:22279}    Previous Study: There {IS/IS KE:30442} a prior study for comparison. Date: ***. Provider: ***. Compared to prior study, today's study showed***. Follow up EMG is recommended if ***. Technologist: ***  Physician:***    Nerve conduction studies and electromyography were performed according to our laboratory policies and procedures which can be provided upon request. All abnormal values are identified in the table.  Laboratory normal values can also be provided upon request.       Cc: Jennifer Rodrigues, DO Nava Red Lips, DO

## 2023-02-06 DIAGNOSIS — M54.16 BILATERAL LUMBAR RADICULOPATHY: ICD-10-CM

## 2023-02-06 DIAGNOSIS — M54.17 LUMBOSACRAL RADICULOPATHY AT S1: ICD-10-CM

## 2023-02-06 DIAGNOSIS — M54.17 LUMBOSACRAL RADICULOPATHY AT L5: ICD-10-CM

## 2023-02-06 DIAGNOSIS — R20.2 PARESTHESIA OF BOTH FEET: Primary | ICD-10-CM

## 2023-02-15 ENCOUNTER — HOSPITAL ENCOUNTER (OUTPATIENT)
Dept: MRI IMAGING | Age: 68
Discharge: HOME | End: 2023-02-17
Payer: MEDICARE

## 2023-02-15 DIAGNOSIS — M54.17 LUMBOSACRAL RADICULOPATHY AT S1: ICD-10-CM

## 2023-02-15 DIAGNOSIS — M48.062 SPINAL STENOSIS OF LUMBAR REGION WITH NEUROGENIC CLAUDICATION: ICD-10-CM

## 2023-02-15 DIAGNOSIS — M54.17 LUMBOSACRAL RADICULOPATHY AT L5: ICD-10-CM

## 2023-02-15 DIAGNOSIS — M54.16 BILATERAL LUMBAR RADICULOPATHY: ICD-10-CM

## 2023-02-15 DIAGNOSIS — M54.16 LUMBAR RADICULOPATHY: Primary | ICD-10-CM

## 2023-02-15 DIAGNOSIS — R20.2 PARESTHESIA OF BOTH FEET: ICD-10-CM

## 2023-02-15 PROCEDURE — 72148 MRI LUMBAR SPINE W/O DYE: CPT

## 2023-04-03 ENCOUNTER — OFFICE VISIT (OUTPATIENT)
Dept: PRIMARY CARE CLINIC | Age: 68
End: 2023-04-03
Payer: MEDICARE

## 2023-04-03 VITALS
DIASTOLIC BLOOD PRESSURE: 78 MMHG | BODY MASS INDEX: 36.7 KG/M2 | HEIGHT: 72 IN | SYSTOLIC BLOOD PRESSURE: 124 MMHG | HEART RATE: 58 BPM | WEIGHT: 271 LBS | OXYGEN SATURATION: 98 % | RESPIRATION RATE: 16 BRPM

## 2023-04-03 DIAGNOSIS — M51.16 LUMBAR DISC HERNIATION WITH RADICULOPATHY: ICD-10-CM

## 2023-04-03 DIAGNOSIS — M48.062 SPINAL STENOSIS OF LUMBAR REGION WITH NEUROGENIC CLAUDICATION: ICD-10-CM

## 2023-04-03 DIAGNOSIS — E78.2 MIXED HYPERLIPIDEMIA: Primary | ICD-10-CM

## 2023-04-03 DIAGNOSIS — Z12.5 SCREENING FOR MALIGNANT NEOPLASM OF PROSTATE: ICD-10-CM

## 2023-04-03 DIAGNOSIS — M17.0 PRIMARY OSTEOARTHRITIS OF BOTH KNEES: ICD-10-CM

## 2023-04-03 PROCEDURE — 1036F TOBACCO NON-USER: CPT | Performed by: FAMILY MEDICINE

## 2023-04-03 PROCEDURE — 99214 OFFICE O/P EST MOD 30 MIN: CPT | Performed by: FAMILY MEDICINE

## 2023-04-03 PROCEDURE — G8427 DOCREV CUR MEDS BY ELIG CLIN: HCPCS | Performed by: FAMILY MEDICINE

## 2023-04-03 PROCEDURE — 1123F ACP DISCUSS/DSCN MKR DOCD: CPT | Performed by: FAMILY MEDICINE

## 2023-04-03 PROCEDURE — G8417 CALC BMI ABV UP PARAM F/U: HCPCS | Performed by: FAMILY MEDICINE

## 2023-04-03 PROCEDURE — 3017F COLORECTAL CA SCREEN DOC REV: CPT | Performed by: FAMILY MEDICINE

## 2023-04-03 SDOH — ECONOMIC STABILITY: HOUSING INSECURITY
IN THE LAST 12 MONTHS, WAS THERE A TIME WHEN YOU DID NOT HAVE A STEADY PLACE TO SLEEP OR SLEPT IN A SHELTER (INCLUDING NOW)?: PATIENT REFUSED

## 2023-04-03 SDOH — ECONOMIC STABILITY: INCOME INSECURITY: HOW HARD IS IT FOR YOU TO PAY FOR THE VERY BASICS LIKE FOOD, HOUSING, MEDICAL CARE, AND HEATING?: PATIENT DECLINED

## 2023-04-03 SDOH — ECONOMIC STABILITY: FOOD INSECURITY: WITHIN THE PAST 12 MONTHS, THE FOOD YOU BOUGHT JUST DIDN'T LAST AND YOU DIDN'T HAVE MONEY TO GET MORE.: PATIENT DECLINED

## 2023-04-03 SDOH — ECONOMIC STABILITY: FOOD INSECURITY: WITHIN THE PAST 12 MONTHS, YOU WORRIED THAT YOUR FOOD WOULD RUN OUT BEFORE YOU GOT MONEY TO BUY MORE.: PATIENT DECLINED

## 2023-04-03 ASSESSMENT — ENCOUNTER SYMPTOMS
CHEST TIGHTNESS: 0
SHORTNESS OF BREATH: 0
BACK PAIN: 1
WHEEZING: 0
EYE ITCHING: 0
APNEA: 0
ABDOMINAL PAIN: 0
BLOOD IN STOOL: 0
COUGH: 0
DIARRHEA: 0
CONSTIPATION: 0
EYE PAIN: 0
VOMITING: 0
EYE REDNESS: 0
SINUS PRESSURE: 0
SORE THROAT: 0
COLOR CHANGE: 0
RHINORRHEA: 0
NAUSEA: 0

## 2023-04-03 ASSESSMENT — PATIENT HEALTH QUESTIONNAIRE - PHQ9
SUM OF ALL RESPONSES TO PHQ QUESTIONS 1-9: 0
SUM OF ALL RESPONSES TO PHQ QUESTIONS 1-9: 0
1. LITTLE INTEREST OR PLEASURE IN DOING THINGS: 0
SUM OF ALL RESPONSES TO PHQ QUESTIONS 1-9: 0
2. FEELING DOWN, DEPRESSED OR HOPELESS: 0
SUM OF ALL RESPONSES TO PHQ9 QUESTIONS 1 & 2: 0
SUM OF ALL RESPONSES TO PHQ QUESTIONS 1-9: 0

## 2023-04-03 NOTE — PROGRESS NOTES
leg swelling. Gastrointestinal:  Negative for abdominal pain, blood in stool, constipation, diarrhea, nausea and vomiting. Endocrine: Negative. Genitourinary:  Negative for decreased urine volume, difficulty urinating, dysuria, frequency, hematuria and urgency. Musculoskeletal:  Positive for back pain. Negative for arthralgias, gait problem, myalgias and neck pain. Skin:  Negative for color change and rash. Allergic/Immunologic: Negative for environmental allergies and food allergies. Neurological:  Positive for tingling, numbness and paresthesias. Negative for dizziness, weakness, light-headedness and headaches. Hematological:  Negative for adenopathy. Does not bruise/bleed easily. Psychiatric/Behavioral:  Negative for behavioral problems, dysphoric mood and sleep disturbance. The patient is not nervous/anxious and is not hyperactive. All other systems reviewed and are negative. /78   Pulse 58   Resp 16   Ht 6' (1.829 m)   Wt 271 lb (122.9 kg)   SpO2 98%   BMI 36.75 kg/m²     Physical Exam  Vitals and nursing note reviewed. Constitutional:       General: He is not in acute distress. Appearance: Normal appearance. He is well-developed. HENT:      Head: Normocephalic and atraumatic. Right Ear: Hearing, tympanic membrane and external ear normal. No tenderness. No middle ear effusion. Left Ear: Hearing, tympanic membrane and external ear normal. No tenderness. No middle ear effusion. Nose: Nose normal. No congestion or rhinorrhea. Right Turbinates: Not enlarged. Left Turbinates: Not enlarged. Mouth/Throat:      Mouth: Mucous membranes are moist.      Tongue: No lesions. Pharynx: Oropharynx is clear. No oropharyngeal exudate or posterior oropharyngeal erythema. Eyes:      General: No scleral icterus. Conjunctiva/sclera: Conjunctivae normal.      Pupils: Pupils are equal, round, and reactive to light.    Neck:      Thyroid: No

## 2023-04-19 ENCOUNTER — HOSPITAL ENCOUNTER (OUTPATIENT)
Dept: GENERAL RADIOLOGY | Age: 68
Discharge: HOME | End: 2023-04-21
Payer: MEDICARE

## 2023-04-19 ENCOUNTER — OFFICE VISIT (OUTPATIENT)
Dept: NEUROSURGERY | Age: 68
End: 2023-04-19
Payer: MEDICARE

## 2023-04-19 ENCOUNTER — HOSPITAL ENCOUNTER (OUTPATIENT)
Age: 68
Discharge: HOME | End: 2023-04-21
Payer: MEDICARE

## 2023-04-19 VITALS
DIASTOLIC BLOOD PRESSURE: 82 MMHG | HEART RATE: 52 BPM | BODY MASS INDEX: 36.75 KG/M2 | SYSTOLIC BLOOD PRESSURE: 136 MMHG | OXYGEN SATURATION: 96 % | TEMPERATURE: 97.2 F | WEIGHT: 271 LBS

## 2023-04-19 DIAGNOSIS — M51.16 LUMBAR DISC HERNIATION WITH RADICULOPATHY: ICD-10-CM

## 2023-04-19 DIAGNOSIS — M51.16 LUMBAR DISC HERNIATION WITH RADICULOPATHY: Primary | ICD-10-CM

## 2023-04-19 DIAGNOSIS — E66.01 SEVERE OBESITY (BMI 35.0-39.9) WITH COMORBIDITY (HCC): ICD-10-CM

## 2023-04-19 PROCEDURE — G8427 DOCREV CUR MEDS BY ELIG CLIN: HCPCS | Performed by: NEUROLOGICAL SURGERY

## 2023-04-19 PROCEDURE — 72120 X-RAY BEND ONLY L-S SPINE: CPT

## 2023-04-19 PROCEDURE — 1036F TOBACCO NON-USER: CPT | Performed by: NEUROLOGICAL SURGERY

## 2023-04-19 PROCEDURE — G8417 CALC BMI ABV UP PARAM F/U: HCPCS | Performed by: NEUROLOGICAL SURGERY

## 2023-04-19 PROCEDURE — 3017F COLORECTAL CA SCREEN DOC REV: CPT | Performed by: NEUROLOGICAL SURGERY

## 2023-04-19 PROCEDURE — 99214 OFFICE O/P EST MOD 30 MIN: CPT | Performed by: NEUROLOGICAL SURGERY

## 2023-04-19 PROCEDURE — 1123F ACP DISCUSS/DSCN MKR DOCD: CPT | Performed by: NEUROLOGICAL SURGERY

## 2023-04-19 RX ORDER — GABAPENTIN 300 MG/1
300 CAPSULE ORAL NIGHTLY
Qty: 90 CAPSULE | Refills: 1 | Status: SHIPPED | OUTPATIENT
Start: 2023-04-19 | End: 2023-10-16

## 2023-04-19 NOTE — PROGRESS NOTES
[facility] NEUROSURGERY  [address]  [phone]       Chief Complaint:   Chief Complaint   Patient presents with    Follow-up     Here to review MRI  No pain per patient today       HPI:     I had the pleasure of seeing Clayton Diamond today in neurosurgical clinic. As you know this 69-year-old right-handed  father of 4 non-smoker nondrinker and  was previously seen by our team for a T7 chip fracture back in 2021. Today he is being evaluated for results from an EMG with nerve conduction studies and MRI. Against his background he endorses occasional back pain but nothing terrible. He states that what prompted the EMG with nerve conduction studies and subsequent MRI was a 67-year history of feeling so he was walking on marbles in his feet bilaterally. He changed his work shoes and this somewhat resolved. He then noted that his left fifth digit of the foot was going numb. Now he endorses that both feet are numb bilaterally both on the anterior and plantar surface of the feet. He denies any particia radiculopathy when asked. There is no patricia weakness. He has had no loss of bowel or bladder function. No past medical history on file. No past surgical history on file. No family history on file.    Social History     Socioeconomic History    Marital status: Single     Spouse name: Not on file    Number of children: Not on file    Years of education: Not on file    Highest education level: Not on file   Occupational History    Not on file   Tobacco Use    Smoking status: Never    Smokeless tobacco: Never   Vaping Use    Vaping Use: Never used   Substance and Sexual Activity    Alcohol use: Never    Drug use: Never    Sexual activity: Not on file   Other Topics Concern    Not on file   Social History Narrative    Not on file     Social Determinants of Health     Financial

## 2023-09-28 DIAGNOSIS — Z12.5 SCREENING FOR MALIGNANT NEOPLASM OF PROSTATE: ICD-10-CM

## 2023-09-28 DIAGNOSIS — E78.2 MIXED HYPERLIPIDEMIA: ICD-10-CM

## 2023-09-28 LAB
ALBUMIN SERPL-MCNC: 4.5 G/DL (ref 3.5–5.2)
ALP BLD-CCNC: 84 U/L (ref 40–129)
ALT SERPL-CCNC: 16 U/L (ref 0–40)
ANION GAP SERPL CALCULATED.3IONS-SCNC: 13 MMOL/L (ref 7–16)
AST SERPL-CCNC: 25 U/L (ref 0–39)
BILIRUB SERPL-MCNC: 0.4 MG/DL (ref 0–1.2)
BUN BLDV-MCNC: 17 MG/DL (ref 6–23)
CALCIUM SERPL-MCNC: 9 MG/DL (ref 8.6–10.2)
CHLORIDE BLD-SCNC: 105 MMOL/L (ref 98–107)
CHOLESTEROL: 175 MG/DL
CO2: 23 MMOL/L (ref 22–29)
CREAT SERPL-MCNC: 0.9 MG/DL (ref 0.7–1.2)
GFR SERPL CREATININE-BSD FRML MDRD: >60 ML/MIN/1.73M2
GLUCOSE BLD-MCNC: 101 MG/DL (ref 74–99)
HDLC SERPL-MCNC: 52 MG/DL
LDL CHOLESTEROL: 109 MG/DL
POTASSIUM SERPL-SCNC: 4.5 MMOL/L (ref 3.5–5)
PROSTATE SPECIFIC ANTIGEN: 2.22 NG/ML (ref 0–4)
SODIUM BLD-SCNC: 141 MMOL/L (ref 132–146)
TOTAL PROTEIN: 6.5 G/DL (ref 6.4–8.3)
TRIGL SERPL-MCNC: 71 MG/DL
VLDLC SERPL CALC-MCNC: 14 MG/DL

## 2023-10-01 SDOH — HEALTH STABILITY: PHYSICAL HEALTH: ON AVERAGE, HOW MANY DAYS PER WEEK DO YOU ENGAGE IN MODERATE TO STRENUOUS EXERCISE (LIKE A BRISK WALK)?: 5 DAYS

## 2023-10-01 SDOH — HEALTH STABILITY: PHYSICAL HEALTH: ON AVERAGE, HOW MANY MINUTES DO YOU ENGAGE IN EXERCISE AT THIS LEVEL?: 90 MIN

## 2023-10-01 ASSESSMENT — PATIENT HEALTH QUESTIONNAIRE - PHQ9
2. FEELING DOWN, DEPRESSED OR HOPELESS: 0
1. LITTLE INTEREST OR PLEASURE IN DOING THINGS: 0
SUM OF ALL RESPONSES TO PHQ QUESTIONS 1-9: 0
SUM OF ALL RESPONSES TO PHQ9 QUESTIONS 1 & 2: 0

## 2023-10-01 ASSESSMENT — LIFESTYLE VARIABLES
HOW OFTEN DO YOU HAVE A DRINK CONTAINING ALCOHOL: NEVER
HOW MANY STANDARD DRINKS CONTAINING ALCOHOL DO YOU HAVE ON A TYPICAL DAY: PATIENT DOES NOT DRINK
HOW OFTEN DO YOU HAVE SIX OR MORE DRINKS ON ONE OCCASION: 1
HOW MANY STANDARD DRINKS CONTAINING ALCOHOL DO YOU HAVE ON A TYPICAL DAY: 0
HOW OFTEN DO YOU HAVE A DRINK CONTAINING ALCOHOL: 1

## 2023-10-02 ENCOUNTER — OFFICE VISIT (OUTPATIENT)
Dept: PRIMARY CARE CLINIC | Age: 68
End: 2023-10-02
Payer: MEDICARE

## 2023-10-02 VITALS
WEIGHT: 258 LBS | HEIGHT: 72 IN | TEMPERATURE: 97.8 F | DIASTOLIC BLOOD PRESSURE: 80 MMHG | BODY MASS INDEX: 34.95 KG/M2 | OXYGEN SATURATION: 100 % | HEART RATE: 58 BPM | SYSTOLIC BLOOD PRESSURE: 126 MMHG

## 2023-10-02 DIAGNOSIS — Z12.11 SCREEN FOR COLON CANCER: ICD-10-CM

## 2023-10-02 DIAGNOSIS — M17.0 PRIMARY OSTEOARTHRITIS OF BOTH KNEES: ICD-10-CM

## 2023-10-02 DIAGNOSIS — Z00.00 MEDICARE ANNUAL WELLNESS VISIT, SUBSEQUENT: Primary | ICD-10-CM

## 2023-10-02 DIAGNOSIS — E78.2 MIXED HYPERLIPIDEMIA: ICD-10-CM

## 2023-10-02 PROCEDURE — G8484 FLU IMMUNIZE NO ADMIN: HCPCS | Performed by: FAMILY MEDICINE

## 2023-10-02 PROCEDURE — 3017F COLORECTAL CA SCREEN DOC REV: CPT | Performed by: FAMILY MEDICINE

## 2023-10-02 PROCEDURE — G8417 CALC BMI ABV UP PARAM F/U: HCPCS | Performed by: FAMILY MEDICINE

## 2023-10-02 PROCEDURE — G0439 PPPS, SUBSEQ VISIT: HCPCS | Performed by: FAMILY MEDICINE

## 2023-10-02 PROCEDURE — 1036F TOBACCO NON-USER: CPT | Performed by: FAMILY MEDICINE

## 2023-10-02 PROCEDURE — 1123F ACP DISCUSS/DSCN MKR DOCD: CPT | Performed by: FAMILY MEDICINE

## 2023-10-02 PROCEDURE — G8427 DOCREV CUR MEDS BY ELIG CLIN: HCPCS | Performed by: FAMILY MEDICINE

## 2023-10-02 PROCEDURE — 99214 OFFICE O/P EST MOD 30 MIN: CPT | Performed by: FAMILY MEDICINE

## 2023-10-02 ASSESSMENT — ENCOUNTER SYMPTOMS
RHINORRHEA: 0
EYE REDNESS: 0
CONSTIPATION: 0
COLOR CHANGE: 0
CHEST TIGHTNESS: 0
EYE ITCHING: 0
SORE THROAT: 0
EYE PAIN: 0
BACK PAIN: 1
COUGH: 0
SHORTNESS OF BREATH: 0
DIARRHEA: 0
BLOOD IN STOOL: 0
NAUSEA: 0
ABDOMINAL PAIN: 0
VOMITING: 0
SINUS PRESSURE: 0
WHEEZING: 0
APNEA: 0

## 2023-10-02 NOTE — PROGRESS NOTES
Chief Complaint:     Chief Complaint   Patient presents with    Medicare AWV         Hyperlipidemia  This is a recurrent problem. The current episode started more than 1 month ago. Recent lipid tests were reviewed and are variable. Exacerbating diseases include obesity. There are no known factors aggravating his hyperlipidemia. Associated symptoms include leg pain. Pertinent negatives include no chest pain, myalgias or shortness of breath. He is currently on no antihyperlipidemic treatment. The current treatment provides no improvement of lipids. There are no compliance problems. Knee Pain   The incident occurred more than 1 week ago. The incident occurred at home. There was no injury mechanism. The pain is present in the left knee and right knee. The quality of the pain is described as aching. The pain is moderate. Associated symptoms include an inability to bear weight, a loss of motion, muscle weakness, numbness and tingling. The symptoms are aggravated by movement, palpation and weight bearing. He has tried nothing for the symptoms. The treatment provided no relief. Back Pain  This is a recurrent problem. The current episode started more than 1 month ago. The problem occurs daily. The problem is unchanged. The pain is present in the lumbar spine. The quality of the pain is described as burning. The pain radiates to the left foot and right foot. The pain is moderate. The symptoms are aggravated by bending, position and twisting. Associated symptoms include leg pain, numbness, paresthesias and tingling. Pertinent negatives include no abdominal pain, chest pain, dysuria, fever, headaches or weakness. He has tried NSAIDs and home exercises for the symptoms. The treatment provided no relief.        Patient Active Problem List   Diagnosis    Fall (on) (from) unspecified stairs and steps, initial encounter    Kidney laceration, right    Multiple closed fractures of ribs of both sides    Closed T7 fracture ([address])

## 2023-10-02 NOTE — PATIENT INSTRUCTIONS
your medical history including lifestyle, illnesses that may run in your family, and various assessments and screenings as appropriate. After reviewing your medical record and screening and assessments performed today your provider may have ordered immunizations, labs, imaging, and/or referrals for you. A list of these orders (if applicable) as well as your Preventive Care list are included within your After Visit Summary for your review. Other Preventive Recommendations:    A preventive eye exam performed by an eye specialist is recommended every 1-2 years to screen for glaucoma; cataracts, macular degeneration, and other eye disorders. A preventive dental visit is recommended every 6 months. Try to get at least 150 minutes of exercise per week or 10,000 steps per day on a pedometer . Order or download the FREE \"Exercise & Physical Activity: Your Everyday Guide\" from The [employer]. Call 6-788.446.1612 or search The [employer] online. You need 3207-0210 mg of calcium and 1802-7570 IU of vitamin D per day. It is possible to meet your calcium requirement with diet alone, but a vitamin D supplement is usually necessary to meet this goal.  When exposed to the sun, use a sunscreen that protects against both UVA and UVB radiation with an SPF of 30 or greater. Reapply every 2 to 3 hours or after sweating, drying off with a towel, or swimming. Always wear a seat belt when traveling in a car. Always wear a helmet when riding a bicycle or motorcycle.

## 2023-10-31 ENCOUNTER — OFFICE VISIT (OUTPATIENT)
Dept: SURGERY | Age: 68
End: 2023-10-31

## 2023-10-31 VITALS
BODY MASS INDEX: 35.35 KG/M2 | SYSTOLIC BLOOD PRESSURE: 136 MMHG | TEMPERATURE: 97.7 F | OXYGEN SATURATION: 97 % | HEIGHT: 72 IN | DIASTOLIC BLOOD PRESSURE: 81 MMHG | WEIGHT: 261 LBS | HEART RATE: 63 BPM

## 2023-10-31 DIAGNOSIS — Z12.11 ENCOUNTER FOR SCREENING COLONOSCOPY: Primary | ICD-10-CM

## 2023-10-31 RX ORDER — ASPIRIN 81 MG/1
81 TABLET ORAL DAILY
COMMUNITY

## 2023-11-08 ENCOUNTER — PREP FOR PROCEDURE (OUTPATIENT)
Dept: SURGERY | Age: 68
End: 2023-11-08

## 2023-11-08 DIAGNOSIS — Z12.11 SCREENING FOR COLON CANCER: ICD-10-CM

## 2023-12-08 PROBLEM — Z12.11 SCREENING FOR COLON CANCER: Status: RESOLVED | Noted: 2023-11-08 | Resolved: 2023-12-08

## 2023-12-27 ENCOUNTER — TELEPHONE (OUTPATIENT)
Dept: SURGERY | Age: 68
End: 2023-12-27

## 2023-12-27 NOTE — TELEPHONE ENCOUNTER
Prior Authorization Form:      DEMOGRAPHICS:                     Patient Name:  Grayson Ontiveros  Patient :  1955            Insurance:  Payor: MEDICARE / Plan: MEDICARE PART A AND B / Product Type: *No Product type* /   Insurance ID Number:    Payer/Plan Subscr  Sex Relation Sub. Ins. ID Effective Group Num   1. MEDICARE - MEHyland Erika* 1955 Male Self [id number] 1/1/15                                    [address] [address]   2.  [address]* 1955 Male Self [id number] 1/1/15 [id number]                                   [address]         DIAGNOSIS & PROCEDURE:                       Procedure/Operation: colon           CPT Code: 25881    Diagnosis:  screening    ICD10 Code: z12.11    Location:  seb    Surgeon:  Wesley Stern INFORMATION:                          Date: 24    Time: 830am              Anesthesia:  MAC/TIVA                                                       Status:  Outpatient        Special Comments:         Electronically signed by Genevieve Mukherjee MA on 2023 at 2:15 PM

## 2023-12-27 NOTE — TELEPHONE ENCOUNTER
Escobar Beltrán is scheduled for colon with Dr Carla Carrington on 4/30/24 at 26am. Patient needs to be NPO after midnight the night before procedure. All surgery instructions were explained to the patient and a surgery letter was also mailed out. MA informed patient that PAT will also be calling to review pre-op instructions and medications. Patient verbalized understanding.

## 2024-01-02 ENCOUNTER — TELEMEDICINE (OUTPATIENT)
Dept: PRIMARY CARE CLINIC | Age: 69
End: 2024-01-02
Payer: MEDICARE

## 2024-01-02 DIAGNOSIS — R05.1 ACUTE COUGH: Primary | ICD-10-CM

## 2024-01-02 PROCEDURE — G8417 CALC BMI ABV UP PARAM F/U: HCPCS | Performed by: NURSE PRACTITIONER

## 2024-01-02 PROCEDURE — 99213 OFFICE O/P EST LOW 20 MIN: CPT | Performed by: NURSE PRACTITIONER

## 2024-01-02 PROCEDURE — G8484 FLU IMMUNIZE NO ADMIN: HCPCS | Performed by: NURSE PRACTITIONER

## 2024-01-02 PROCEDURE — 1036F TOBACCO NON-USER: CPT | Performed by: NURSE PRACTITIONER

## 2024-01-02 PROCEDURE — 1123F ACP DISCUSS/DSCN MKR DOCD: CPT | Performed by: NURSE PRACTITIONER

## 2024-01-02 PROCEDURE — G8427 DOCREV CUR MEDS BY ELIG CLIN: HCPCS | Performed by: NURSE PRACTITIONER

## 2024-01-02 PROCEDURE — 3017F COLORECTAL CA SCREEN DOC REV: CPT | Performed by: NURSE PRACTITIONER

## 2024-01-02 RX ORDER — DEXTROMETHORPHAN HYDROBROMIDE AND PROMETHAZINE HYDROCHLORIDE 15; 6.25 MG/5ML; MG/5ML
5 SYRUP ORAL 4 TIMES DAILY PRN
Qty: 120 ML | Refills: 0 | Status: SHIPPED | OUTPATIENT
Start: 2024-01-02

## 2024-01-02 RX ORDER — BENZONATATE 200 MG/1
200 CAPSULE ORAL 3 TIMES DAILY PRN
Qty: 30 CAPSULE | Refills: 0 | Status: SHIPPED | OUTPATIENT
Start: 2024-01-02 | End: 2024-01-12

## 2024-01-02 ASSESSMENT — ENCOUNTER SYMPTOMS
COUGH: 1
WHEEZING: 0
SHORTNESS OF BREATH: 0

## 2024-01-02 NOTE — PROGRESS NOTES
Chris Cheng, was evaluated through a synchronous (real-time) audio-video encounter. The patient (or guardian if applicable) is aware that this is a billable service, which includes applicable co-pays. This Virtual Visit was conducted with patient's (and/or legal guardian's) consent. Patient identification was verified, and a caregiver was present when appropriate.   The patient was located at Home: [address] [place] [address]  Provider was located at Home ([facility]): RAMON Cheng (:  1955) is a Established patient, presenting virtually for evaluation of the following:  Pt c/o cough x2.5-3 weeks, mucus production green/white in color, cough is worse at night, denies body aches, fever, chills, has done a couple of home COVID tests which were negative.     Assessment & Plan   Below is the assessment and plan developed based on review of pertinent history, physical exam, labs, studies, and medications.  1. Acute cough  Problem  -     promethazine-dextromethorphan (PROMETHAZINE-DM) 6.25-15 MG/5ML syrup; Take 5 mLs by mouth 4 times daily as needed for Cough, Disp-120 mL, R-0Normal  -     benzonatate (TESSALON) 200 MG capsule; Take 1 capsule by mouth 3 times daily as needed for Cough, Disp-30 capsule, R-0Normal  He was instructed to alternate between the above medications about 4-5  hours from taking each other. Do not take together. He verbalizes understanding.       A cough is your body's response to something that bothers your throat or airways. Many things can cause a cough. You might cough because of a cold or the flu, bronchitis, or asthma. Smoking, postnasal drip, allergies, and stomach acid that backs up into your throat also can cause coughs.  A cough is a symptom, not a disease. Most coughs stop when the cause, such as a cold, goes away. You can take a few steps at home to cough less and feel better.  Follow-up care is a key part of your treatment and safety. Be sure

## 2024-03-26 DIAGNOSIS — E78.2 MIXED HYPERLIPIDEMIA: ICD-10-CM

## 2024-03-26 LAB
ALBUMIN SERPL-MCNC: 4.3 G/DL (ref 3.5–5.2)
ALP BLD-CCNC: 93 U/L (ref 40–129)
ALT SERPL-CCNC: 16 U/L (ref 0–40)
ANION GAP SERPL CALCULATED.3IONS-SCNC: 11 MMOL/L (ref 7–16)
AST SERPL-CCNC: 22 U/L (ref 0–39)
BILIRUB SERPL-MCNC: 0.5 MG/DL (ref 0–1.2)
BUN BLDV-MCNC: 22 MG/DL (ref 6–23)
CALCIUM SERPL-MCNC: 9.3 MG/DL (ref 8.6–10.2)
CHLORIDE BLD-SCNC: 102 MMOL/L (ref 98–107)
CHOLESTEROL: 198 MG/DL
CO2: 25 MMOL/L (ref 22–29)
CREAT SERPL-MCNC: 0.9 MG/DL (ref 0.7–1.2)
GFR SERPL CREATININE-BSD FRML MDRD: >90 ML/MIN/1.73M2
GLUCOSE BLD-MCNC: 93 MG/DL (ref 74–99)
HDLC SERPL-MCNC: 48 MG/DL
LDL CHOLESTEROL: 130 MG/DL
POTASSIUM SERPL-SCNC: 4.4 MMOL/L (ref 3.5–5)
SODIUM BLD-SCNC: 138 MMOL/L (ref 132–146)
TOTAL PROTEIN: 6.9 G/DL (ref 6.4–8.3)
TRIGL SERPL-MCNC: 99 MG/DL
TSH SERPL DL<=0.05 MIU/L-ACNC: 3.48 UIU/ML (ref 0.27–4.2)
VLDLC SERPL CALC-MCNC: 20 MG/DL

## 2024-04-06 SDOH — ECONOMIC STABILITY: FOOD INSECURITY: WITHIN THE PAST 12 MONTHS, YOU WORRIED THAT YOUR FOOD WOULD RUN OUT BEFORE YOU GOT MONEY TO BUY MORE.: NEVER TRUE

## 2024-04-06 SDOH — ECONOMIC STABILITY: INCOME INSECURITY: HOW HARD IS IT FOR YOU TO PAY FOR THE VERY BASICS LIKE FOOD, HOUSING, MEDICAL CARE, AND HEATING?: NOT VERY HARD

## 2024-04-06 SDOH — ECONOMIC STABILITY: FOOD INSECURITY: WITHIN THE PAST 12 MONTHS, THE FOOD YOU BOUGHT JUST DIDN'T LAST AND YOU DIDN'T HAVE MONEY TO GET MORE.: NEVER TRUE

## 2024-04-06 SDOH — ECONOMIC STABILITY: HOUSING INSECURITY
IN THE LAST 12 MONTHS, WAS THERE A TIME WHEN YOU DID NOT HAVE A STEADY PLACE TO SLEEP OR SLEPT IN A SHELTER (INCLUDING NOW)?: NO

## 2024-04-06 SDOH — ECONOMIC STABILITY: TRANSPORTATION INSECURITY
IN THE PAST 12 MONTHS, HAS LACK OF TRANSPORTATION KEPT YOU FROM MEETINGS, WORK, OR FROM GETTING THINGS NEEDED FOR DAILY LIVING?: NO

## 2024-04-06 ASSESSMENT — PATIENT HEALTH QUESTIONNAIRE - PHQ9
SUM OF ALL RESPONSES TO PHQ QUESTIONS 1-9: 0
1. LITTLE INTEREST OR PLEASURE IN DOING THINGS: NOT AT ALL
SUM OF ALL RESPONSES TO PHQ QUESTIONS 1-9: 0
1. LITTLE INTEREST OR PLEASURE IN DOING THINGS: NOT AT ALL
SUM OF ALL RESPONSES TO PHQ9 QUESTIONS 1 & 2: 0
SUM OF ALL RESPONSES TO PHQ9 QUESTIONS 1 & 2: 0
2. FEELING DOWN, DEPRESSED OR HOPELESS: NOT AT ALL
SUM OF ALL RESPONSES TO PHQ QUESTIONS 1-9: 0
SUM OF ALL RESPONSES TO PHQ QUESTIONS 1-9: 0
2. FEELING DOWN, DEPRESSED OR HOPELESS: NOT AT ALL

## 2024-04-09 ENCOUNTER — OFFICE VISIT (OUTPATIENT)
Dept: PRIMARY CARE CLINIC | Age: 69
End: 2024-04-09

## 2024-04-09 VITALS
DIASTOLIC BLOOD PRESSURE: 74 MMHG | BODY MASS INDEX: 34.4 KG/M2 | SYSTOLIC BLOOD PRESSURE: 136 MMHG | HEART RATE: 71 BPM | HEIGHT: 72 IN | OXYGEN SATURATION: 98 % | RESPIRATION RATE: 16 BRPM | WEIGHT: 254 LBS

## 2024-04-09 DIAGNOSIS — M17.0 PRIMARY OSTEOARTHRITIS OF BOTH KNEES: ICD-10-CM

## 2024-04-09 DIAGNOSIS — E78.2 MIXED HYPERLIPIDEMIA: Primary | ICD-10-CM

## 2024-04-09 PROBLEM — M48.062 SPINAL STENOSIS OF LUMBAR REGION WITH NEUROGENIC CLAUDICATION: Status: RESOLVED | Noted: 2023-04-03 | Resolved: 2024-04-09

## 2024-04-09 PROBLEM — M51.16 LUMBAR DISC HERNIATION WITH RADICULOPATHY: Status: RESOLVED | Noted: 2023-04-03 | Resolved: 2024-04-09
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
## 2024-04-09 PROBLEM — S22.069A CLOSED T7 FRACTURE (HCC): Status: RESOLVED | Noted: 2021-09-25 | Resolved: 2024-04-09

## 2024-04-09 ASSESSMENT — ENCOUNTER SYMPTOMS
CONSTIPATION: 0
BACK PAIN: 1
SORE THROAT: 0
EYE ITCHING: 0
SHORTNESS OF BREATH: 0
ABDOMINAL PAIN: 0
RHINORRHEA: 0
COLOR CHANGE: 0
DIARRHEA: 0
WHEEZING: 0
EYE PAIN: 0
NAUSEA: 0
CHEST TIGHTNESS: 0
APNEA: 0
SINUS PRESSURE: 0
VOMITING: 0
BLOOD IN STOOL: 0
COUGH: 0

## 2024-04-09 NOTE — PROGRESS NOTES
Chief Complaint:     Chief Complaint   Patient presents with    Hyperlipidemia         Hyperlipidemia  This is a recurrent problem. The current episode started more than 1 month ago. Recent lipid tests were reviewed and are variable. Exacerbating diseases include obesity. There are no known factors aggravating his hyperlipidemia. Associated symptoms include leg pain. Pertinent negatives include no chest pain, myalgias or shortness of breath. He is currently on no antihyperlipidemic treatment. The current treatment provides no improvement of lipids. There are no compliance problems.    Knee Pain   The incident occurred more than 1 week ago. The incident occurred at home. There was no injury mechanism. The pain is present in the left knee and right knee. The quality of the pain is described as aching. The pain is moderate. Associated symptoms include an inability to bear weight, a loss of motion, muscle weakness, numbness and tingling. The symptoms are aggravated by movement, palpation and weight bearing. He has tried nothing for the symptoms. The treatment provided no relief.       Patient Active Problem List   Diagnosis    Paresthesia of both feet    Hyperlipidemia    Primary osteoarthritis of both knees    Severe obesity (BMI 35.0-39.9) with comorbidity (HCC)       History reviewed. No pertinent past medical history.    History reviewed. No pertinent surgical history.    Current Outpatient Medications   Medication Sig Dispense Refill    aspirin 81 MG EC tablet Take 1 tablet by mouth daily       No current facility-administered medications for this visit.       Allergies   Allergen Reactions    Fish-Derived Products Anaphylaxis       Social History     Socioeconomic History    Marital status: Single     Spouse name: None    Number of children: None    Years of education: None    Highest education level: None   Tobacco Use    Smoking status: Never    Smokeless tobacco: Never   Vaping Use    Vaping Use: Never used

## 2024-04-25 RX ORDER — ASCORBIC ACID 500 MG
500 TABLET ORAL DAILY
COMMUNITY

## 2024-04-25 RX ORDER — M-VIT,TX,IRON,MINS/CALC/FOLIC 27MG-0.4MG
1 TABLET ORAL DAILY
COMMUNITY

## 2024-04-25 RX ORDER — GINSENG 100 MG
50 CAPSULE ORAL DAILY
COMMUNITY

## 2024-04-25 NOTE — PROGRESS NOTES
[facility] PRE-ADMISSION TESTING INSTRUCTIONS    The Preadmission Testing patient is instructed accordingly using the following criteria (check applicable):    ARRIVAL INSTRUCTIONS:  [x] Parking the day of Surgery is located in the Main Entrance lot.  Upon entering the door, make an immediate right to the surgery reception desk    [x] Bring photo ID and insurance card    [] Bring in a copy of Living will or Durable Power of  papers.    [x] Please be sure to arrange for a responsible adult to provide transportation to and from the hospital    [x] Please arrange for a responsible adult to be with you for the 24 hour period post procedure due to having anesthesia    [x] If you awake am of surgery not feeling well or have temperature >100 please call [phone]    GENERAL INSTRUCTIONS:    [x] No solid foods after midnight, may have up to 8oz of water until 4 hours prior to surgery. No gum, no candy, no mints. NPO time:       [x] You may brush your teeth, do not swallow any toothpaste    [x] Take medications as instructed     [x] Stop herbal supplements and vitamins 5 days prior to procedure    [x] Follow preop dosing of blood thinners per physician instructions    [] Take 1/2 dose of evening insulin, but no insulin after midnight    [] No oral diabetic medications after midnight    [] If diabetic and have low blood sugar or feel symptomatic, take 1-2oz apple juice only    [] Bring inhalers day of surgery    [] Bring urine specimen day of surgery    [x] Shower or bath with soap, lather and rinse well, AM of Surgery, no lotion, powders or creams to surgical site    [x] Follow bowel prep as instructed per surgeon    [x] No tobacco products within 24 hours of surgery     [x] No alcohol or illegal drug use, marijuana included, within 24 hours of surgery.    [x] Jewelry, body piercing's, eyeglasses, contact lenses and dentures are not permitted into surgery (bring cases)      [x] Please do

## 2024-04-29 ENCOUNTER — ANESTHESIA EVENT (OUTPATIENT)
Dept: ENDOSCOPY | Age: 69
End: 2024-04-29
Payer: MEDICARE

## 2024-04-29 NOTE — ANESTHESIA PRE PROCEDURE
Department of Anesthesiology  Preprocedure Note       Name:  Chris Cheng   Age:  69 y.o.  :  1955                                          MRN:  [id number]         Date:  2024      Surgeon: Surgeon(s):  Carter Mcgrath MD    Procedure: Procedure(s):  COLORECTAL CANCER SCREENING, NOT HIGH RISK    Medications prior to admission:   Prior to Admission medications    Medication Sig Start Date End Date Taking? Authorizing Provider   vitamin C (ASCORBIC ACID) 500 MG tablet Take 1 tablet by mouth daily   Yes Ioana Zepeda MD   Multiple Vitamins-Minerals (THERAPEUTIC MULTIVITAMIN-MINERALS) tablet Take 1 tablet by mouth daily   Yes Ioana Zepeda MD   zinc 50 MG TABS tablet Take 1 tablet by mouth daily   Yes Ioana Zepeda MD   aspirin 81 MG EC tablet Take 1 tablet by mouth daily    Ioana Zepeda MD       Current medications:    No current facility-administered medications for this encounter.     Current Outpatient Medications   Medication Sig Dispense Refill   • vitamin C (ASCORBIC ACID) 500 MG tablet Take 1 tablet by mouth daily     • Multiple Vitamins-Minerals (THERAPEUTIC MULTIVITAMIN-MINERALS) tablet Take 1 tablet by mouth daily     • zinc 50 MG TABS tablet Take 1 tablet by mouth daily     • aspirin 81 MG EC tablet Take 1 tablet by mouth daily         Allergies:    Allergies   Allergen Reactions   • Fish-Derived Products Anaphylaxis       Problem List:    Patient Active Problem List   Diagnosis Code   • Paresthesia of both feet R20.2   • Hyperlipidemia E78.5   • Primary osteoarthritis of both knees M17.0   • Severe obesity (BMI 35.0-39.9) with comorbidity (HCC) E66.01       Past Medical History:        Diagnosis Date   • Colon cancer screening        Past Surgical History:        Procedure Laterality Date   • TONSILLECTOMY         Social History:    Social History     Tobacco Use   • Smoking status: Never   • Smokeless tobacco: Never   Substance Use Topics   • Alcohol

## 2024-04-30 ENCOUNTER — ANESTHESIA (OUTPATIENT)
Dept: ENDOSCOPY | Age: 69
End: 2024-04-30
Payer: MEDICARE

## 2024-04-30 ENCOUNTER — HOSPITAL ENCOUNTER (OUTPATIENT)
Age: 69
Setting detail: OUTPATIENT SURGERY
Discharge: HOME | End: 2024-04-30
Attending: SURGERY | Admitting: SURGERY
Payer: MEDICARE

## 2024-04-30 VITALS
HEART RATE: 64 BPM | HEIGHT: 72 IN | RESPIRATION RATE: 16 BRPM | DIASTOLIC BLOOD PRESSURE: 72 MMHG | WEIGHT: 250 LBS | SYSTOLIC BLOOD PRESSURE: 144 MMHG | BODY MASS INDEX: 33.86 KG/M2 | TEMPERATURE: 97.5 F | OXYGEN SATURATION: 98 %

## 2024-04-30 DIAGNOSIS — Z12.11 SCREENING FOR COLON CANCER: ICD-10-CM

## 2024-04-30 PROCEDURE — 87169 MACROSCOPIC EXAM PARASITE: CPT

## 2024-04-30 PROCEDURE — 6360000002 HC RX W HCPCS: Performed by: NURSE ANESTHETIST, CERTIFIED REGISTERED

## 2024-04-30 PROCEDURE — 2709999900 HC NON-CHARGEABLE SUPPLY: Performed by: SURGERY

## 2024-04-30 PROCEDURE — 7100000010 HC PHASE II RECOVERY - FIRST 15 MIN: Performed by: SURGERY

## 2024-04-30 PROCEDURE — 88305 TISSUE EXAM BY PATHOLOGIST: CPT

## 2024-04-30 PROCEDURE — 45380 COLONOSCOPY AND BIOPSY: CPT | Performed by: SURGERY

## 2024-04-30 PROCEDURE — 3700000000 HC ANESTHESIA ATTENDED CARE: Performed by: SURGERY

## 2024-04-30 PROCEDURE — 3609010300 HC COLONOSCOPY W/BIOPSY SINGLE/MULTIPLE: Performed by: SURGERY

## 2024-04-30 PROCEDURE — 3700000001 HC ADD 15 MINUTES (ANESTHESIA): Performed by: SURGERY

## 2024-04-30 PROCEDURE — 2580000003 HC RX 258: Performed by: NURSE ANESTHETIST, CERTIFIED REGISTERED

## 2024-04-30 PROCEDURE — 7100000011 HC PHASE II RECOVERY - ADDTL 15 MIN: Performed by: SURGERY

## 2024-04-30 RX ORDER — FENTANYL CITRATE 50 UG/ML
25 INJECTION, SOLUTION INTRAMUSCULAR; INTRAVENOUS EVERY 5 MIN PRN
Status: DISCONTINUED | OUTPATIENT
Start: 2024-04-30 | End: 2024-04-30 | Stop reason: HOSPADM

## 2024-04-30 RX ORDER — ONDANSETRON 2 MG/ML
4 INJECTION INTRAMUSCULAR; INTRAVENOUS
Status: DISCONTINUED | OUTPATIENT
Start: 2024-04-30 | End: 2024-04-30 | Stop reason: HOSPADM

## 2024-04-30 RX ORDER — NALOXONE HYDROCHLORIDE 0.4 MG/ML
INJECTION, SOLUTION INTRAMUSCULAR; INTRAVENOUS; SUBCUTANEOUS PRN
Status: DISCONTINUED | OUTPATIENT
Start: 2024-04-30 | End: 2024-04-30 | Stop reason: HOSPADM

## 2024-04-30 RX ORDER — SODIUM CHLORIDE 9 MG/ML
INJECTION, SOLUTION INTRAVENOUS CONTINUOUS PRN
Status: DISCONTINUED | OUTPATIENT
Start: 2024-04-30 | End: 2024-04-30 | Stop reason: SDUPTHER

## 2024-04-30 RX ORDER — SODIUM CHLORIDE 0.9 % (FLUSH) 0.9 %
5-40 SYRINGE (ML) INJECTION EVERY 12 HOURS SCHEDULED
Status: DISCONTINUED | OUTPATIENT
Start: 2024-04-30 | End: 2024-04-30 | Stop reason: HOSPADM

## 2024-04-30 RX ORDER — PROPOFOL 10 MG/ML
INJECTION, EMULSION INTRAVENOUS PRN
Status: DISCONTINUED | OUTPATIENT
Start: 2024-04-30 | End: 2024-04-30 | Stop reason: SDUPTHER

## 2024-04-30 RX ORDER — SODIUM CHLORIDE 0.9 % (FLUSH) 0.9 %
5-40 SYRINGE (ML) INJECTION PRN
Status: DISCONTINUED | OUTPATIENT
Start: 2024-04-30 | End: 2024-04-30 | Stop reason: HOSPADM

## 2024-04-30 RX ORDER — SODIUM CHLORIDE 9 MG/ML
INJECTION, SOLUTION INTRAVENOUS PRN
Status: DISCONTINUED | OUTPATIENT
Start: 2024-04-30 | End: 2024-04-30 | Stop reason: HOSPADM

## 2024-04-30 RX ADMIN — PROPOFOL 200 MG: 10 INJECTION, EMULSION INTRAVENOUS at 08:58

## 2024-04-30 RX ADMIN — SODIUM CHLORIDE: 9 INJECTION, SOLUTION INTRAVENOUS at 08:57

## 2024-04-30 RX ADMIN — PROPOFOL 200 MG: 10 INJECTION, EMULSION INTRAVENOUS at 09:08

## 2024-04-30 NOTE — H&P
Expand All Collapse All[]Expand All by Default    [facility] Surgery Clinic Note     Assessment/Plan:        Diagnosis Orders   1. Encounter for screening colonoscopy        We will plan for colonoscopy.                Return for Colonoscopy.             Chief Complaint   Patient presents with    New Patient    Colonoscopy       Colonoscopy screening consult          PCP: Elijah Pickard DO     HPI: Chris Cheng is a 68 y.o. male who presents in consultation for colonoscopy.  He has not had one previously.  He denies any issues.  He has no problems moving his bowels.  There is no blood in the stools.  There is no abdominal pain.  He has had a 45 pound intentional weight loss.  There is no family history of colon cancer or inflammatory bowel disease.         Past Medical History   No past medical history on file.        Past Surgical History   No past surgical history on file.        Home Medications           Prior to Admission medications    Medication Sig Start Date End Date Taking? Authorizing Provider   aspirin 81 MG EC tablet Take 1 tablet by mouth daily     Yes Provider, Ioana, MD                 Allergies   Allergen Reactions    Fish-Derived Products Anaphylaxis         Social History   Social History            Socioeconomic History    Marital status: Single       Spouse name: None    Number of children: None    Years of education: None    Highest education level: None   Tobacco Use    Smoking status: Never    Smokeless tobacco: Never   Vaping Use    Vaping Use: Never used   Substance and Sexual Activity    Alcohol use: Never    Drug use: Never      Social Determinants of Health           Financial Resource Strain: Unknown (4/3/2023)     Overall Financial Resource Strain (CARDIA)      Difficulty of Paying Living Expenses: Patient refused   Food Insecurity: Unknown (4/3/2023)     Hunger Vital Sign      Worried About Running Out of Food in the Last Year: Patient refused      Ran Out of Food in

## 2024-04-30 NOTE — ANESTHESIA POSTPROCEDURE EVALUATION
Department of Anesthesiology  Postprocedure Note    Patient: Chris Cheng  MRN: [id number]  YOB: 1955  Date of evaluation: 4/30/2024    Procedure Summary       Date: 04/30/24 Room / Location: [address]    Anesthesia Start: 0857 Anesthesia Stop: 0924    Procedure: COLONOSCOPY BIOPSY Diagnosis:       Screening for colon cancer      (Screening for colon cancer [Z12.11])    Surgeons: Carter Mcgrath MD Responsible Provider: Herb Garcia MD    Anesthesia Type: MAC ASA Status: 3            Anesthesia Type: MAC    Maria Elean Phase I: Maria Elena Score: 10    Maria Elena Phase II: Maria Elena Score: 10    Anesthesia Post Evaluation    Patient location during evaluation: PACU  Patient participation: complete - patient participated  Level of consciousness: awake and alert  Pain score: 2  Airway patency: patent  Nausea & Vomiting: no nausea and no vomiting  Cardiovascular status: blood pressure returned to baseline  Respiratory status: acceptable  Hydration status: euvolemic  Pain management: adequate    No notable events documented.

## 2024-05-02 LAB
PARASITE IDENTIFIED IN ISOLATE: NORMAL
SOURCE: NORMAL
SURGICAL PATHOLOGY REPORT: NORMAL

## 2024-05-21 ENCOUNTER — OFFICE VISIT (OUTPATIENT)
Dept: SURGERY | Age: 69
End: 2024-05-21

## 2024-05-21 VITALS
WEIGHT: 264.8 LBS | BODY MASS INDEX: 35.87 KG/M2 | HEIGHT: 72 IN | HEART RATE: 55 BPM | SYSTOLIC BLOOD PRESSURE: 126 MMHG | DIASTOLIC BLOOD PRESSURE: 68 MMHG | TEMPERATURE: 97.3 F

## 2024-05-21 DIAGNOSIS — K64.9 HEMORRHOIDS, UNSPECIFIED HEMORRHOID TYPE: ICD-10-CM

## 2024-05-21 DIAGNOSIS — K63.5 COLORECTAL POLYP DETECTED ON COLONOSCOPY: Primary | ICD-10-CM

## 2024-05-21 DIAGNOSIS — K57.30 DIVERTICULOSIS OF LARGE INTESTINE WITHOUT HEMORRHAGE: ICD-10-CM

## 2024-05-21 NOTE — PROGRESS NOTES
[facility] Note    Assessment/Plan:     Diagnosis Orders   1. Colorectal polyp detected on colonoscopy      Repeat colonoscopy in 5 years.      2. Diverticulosis of large intestine without hemorrhage      Fiber diet.      3. Hemorrhoids, unspecified hemorrhoid type      Monitor.          Return in about 5 years (around 5/21/2029) for Colonoscopy.      Chief Complaint   Patient presents with    Follow-up     Colonoscopy follow up 4/30/24       PCP: Elijah Pickard DO    HPI: Chris Cheng is a 69 y.o. male here for follow-up of colonoscopy.  He had a polyp removed.  Pathology was noted to be tubular adenoma.  He had diverticulosis and internal hemorrhoids noted as well.  Likely in the scope endoscopy nurse discover what she thought was possible worm/parasite however this was negative when sent to pathology.  He did note some bleeding next day but that resolved and otherwise his bowels are moving without issue.  There is no further blood in the stool.  He has no abdominal pain.    Review of Systems   All other systems reviewed and are negative.        Past Medical History:   Diagnosis Date    Colon cancer screening        Past Surgical History:   Procedure Laterality Date    COLONOSCOPY N/A 4/30/2024    COLONOSCOPY BIOPSY performed by Carter Mcgrath MD at [facility] ENDOSCOPY    TONSILLECTOMY         No family history on file.    Social History     Socioeconomic History    Marital status: Single     Spouse name: Not on file    Number of children: Not on file    Years of education: Not on file    Highest education level: Not on file   Occupational History    Not on file   Tobacco Use    Smoking status: Never    Smokeless tobacco: Never   Vaping Use    Vaping Use: Never used   Substance and Sexual Activity    Alcohol use: Never    Drug use: Never    Sexual activity: Not on file   Other Topics Concern    Not on file   Social History Narrative    Not on file     Social Determinants of Health

## 2024-10-08 SDOH — HEALTH STABILITY: PHYSICAL HEALTH: ON AVERAGE, HOW MANY MINUTES DO YOU ENGAGE IN EXERCISE AT THIS LEVEL?: 50 MIN

## 2024-10-08 SDOH — HEALTH STABILITY: PHYSICAL HEALTH: ON AVERAGE, HOW MANY DAYS PER WEEK DO YOU ENGAGE IN MODERATE TO STRENUOUS EXERCISE (LIKE A BRISK WALK)?: 5 DAYS

## 2024-10-08 ASSESSMENT — LIFESTYLE VARIABLES
HOW MANY STANDARD DRINKS CONTAINING ALCOHOL DO YOU HAVE ON A TYPICAL DAY: PATIENT DOES NOT DRINK
HOW MANY STANDARD DRINKS CONTAINING ALCOHOL DO YOU HAVE ON A TYPICAL DAY: 0
HOW OFTEN DO YOU HAVE SIX OR MORE DRINKS ON ONE OCCASION: 1
HOW OFTEN DO YOU HAVE A DRINK CONTAINING ALCOHOL: NEVER
HOW OFTEN DO YOU HAVE A DRINK CONTAINING ALCOHOL: 1

## 2024-10-08 ASSESSMENT — PATIENT HEALTH QUESTIONNAIRE - PHQ9
SUM OF ALL RESPONSES TO PHQ QUESTIONS 1-9: 0
2. FEELING DOWN, DEPRESSED OR HOPELESS: NOT AT ALL
1. LITTLE INTEREST OR PLEASURE IN DOING THINGS: NOT AT ALL
SUM OF ALL RESPONSES TO PHQ QUESTIONS 1-9: 0
SUM OF ALL RESPONSES TO PHQ9 QUESTIONS 1 & 2: 0

## 2024-10-10 ENCOUNTER — OFFICE VISIT (OUTPATIENT)
Dept: PRIMARY CARE CLINIC | Age: 69
End: 2024-10-10

## 2024-10-10 VITALS
WEIGHT: 256 LBS | RESPIRATION RATE: 16 BRPM | HEART RATE: 85 BPM | OXYGEN SATURATION: 98 % | DIASTOLIC BLOOD PRESSURE: 74 MMHG | SYSTOLIC BLOOD PRESSURE: 126 MMHG | HEIGHT: 72 IN | BODY MASS INDEX: 34.67 KG/M2

## 2024-10-10 DIAGNOSIS — E78.2 MIXED HYPERLIPIDEMIA: ICD-10-CM

## 2024-10-10 DIAGNOSIS — M17.0 PRIMARY OSTEOARTHRITIS OF BOTH KNEES: ICD-10-CM

## 2024-10-10 DIAGNOSIS — Z12.5 SCREENING FOR MALIGNANT NEOPLASM OF PROSTATE: ICD-10-CM

## 2024-10-10 DIAGNOSIS — Z00.00 MEDICARE ANNUAL WELLNESS VISIT, SUBSEQUENT: Primary | ICD-10-CM

## 2024-10-10 ASSESSMENT — ENCOUNTER SYMPTOMS
COLOR CHANGE: 0
RHINORRHEA: 0
BACK PAIN: 0
WHEEZING: 0
VOMITING: 0
ABDOMINAL PAIN: 0
DIARRHEA: 0
EYE PAIN: 0
SHORTNESS OF BREATH: 0
NAUSEA: 0
COUGH: 0
APNEA: 0
SORE THROAT: 0
CHEST TIGHTNESS: 0
CONSTIPATION: 0
SINUS PRESSURE: 0
EYE ITCHING: 0
EYE REDNESS: 0
BLOOD IN STOOL: 0

## 2024-10-10 NOTE — PROGRESS NOTES
Medicare Annual Wellness Visit    Chris Cheng is here for Medicare AWV, Hyperlipidemia, and Knee Pain    Assessment & Plan   Medicare annual wellness visit, subsequent    Recommendations for Preventive Services Due: see orders and patient instructions/AVS.  Recommended screening schedule for the next 5-10 years is provided to the patient in written form: see Patient Instructions/AVS.     Return for Medicare Annual Wellness Visit in 1 year.     Subjective   The following acute and/or chronic problems were also addressed today:  Lipids, Knee pain    Patient's complete Health Risk Assessment and screening values have been reviewed and are found in Flowsheets. The following problems were reviewed today and where indicated follow up appointments were made and/or referrals ordered.    Positive Risk Factor Screenings with Interventions:                  Abnormal BMI (obese):  Body mass index is 34.72 kg/m². (!) Abnormal  Interventions:  Patient declines any further evaluation or treatment      Dentist Screen:  Have you seen the dentist within the past year?: (!) No    Intervention:  Patient declines any further evaluation or treatment     Vision Screen:  Do you have difficulty driving, watching TV, or doing any of your daily activities because of your eyesight?: No  Have you had an eye exam within the past year?: (!) No  Interventions:   Patient declines any further evaluation or treatment    Safety:  Do you have any tripping hazards - loose or unsecured carpets or rugs?: (!) Yes  Do you have non-slip mats or non-slip surfaces or shower bars or grab bars in your shower or bathtub?: (!) No  Interventions:  Patient declined any further interventions or treatment       CV Risk Counseling:  Patient was asked about his current diet and exercise habits, and personalized advice was provided regarding recommended lifestyle changes. Patient's individual cardiovascular disease risk factors, including advanced age (> 55 for 
volume, difficulty urinating, dysuria, frequency, hematuria and urgency.   Musculoskeletal:  Negative for arthralgias, back pain, gait problem, myalgias and neck pain.   Skin:  Negative for color change and rash.   Allergic/Immunologic: Negative for environmental allergies and food allergies.   Neurological:  Positive for tingling and numbness. Negative for dizziness, weakness, light-headedness and headaches.   Hematological:  Negative for adenopathy. Does not bruise/bleed easily.   Psychiatric/Behavioral:  Negative for behavioral problems, dysphoric mood and sleep disturbance. The patient is not nervous/anxious and is not hyperactive.    All other systems reviewed and are negative.        /74   Pulse 85   Resp 16   Ht 1.829 m (6')   Wt 116.1 kg (256 lb)   SpO2 98%   BMI 34.72 kg/m²     Physical Exam  Vitals and nursing note reviewed.   Constitutional:       General: He is not in acute distress.     Appearance: Normal appearance. He is well-developed.   HENT:      Head: Normocephalic and atraumatic.      Right Ear: Hearing, tympanic membrane and external ear normal. No tenderness. No middle ear effusion.      Left Ear: Hearing, tympanic membrane and external ear normal. No tenderness.  No middle ear effusion.      Nose: Nose normal. No congestion or rhinorrhea.      Right Turbinates: Not enlarged.      Left Turbinates: Not enlarged.      Mouth/Throat:      Mouth: Mucous membranes are moist.      Tongue: No lesions.      Pharynx: Oropharynx is clear. No oropharyngeal exudate or posterior oropharyngeal erythema.   Eyes:      General: No scleral icterus.     Conjunctiva/sclera: Conjunctivae normal.      Pupils: Pupils are equal, round, and reactive to light.   Neck:      Thyroid: No thyromegaly.   Cardiovascular:      Rate and Rhythm: Normal rate and regular rhythm.      Heart sounds: Normal heart sounds. No murmur heard.  Pulmonary:      Effort: Pulmonary effort is normal. No respiratory distress.

## 2024-10-10 NOTE — PATIENT INSTRUCTIONS
exercise program.  A counselor or psychiatrist can help you cope with issues such as depression, anxiety, or family problems that can make it hard to focus on weight loss.  Consider joining a support group for people who are trying to lose weight. Your doctor can suggest groups in your area.  Where can you learn more?  Go to https://www.[employer].net/patientEd and enter U357 to learn more about \"Starting a Weight Loss Plan: Care Instructions.\"  Current as of: September 20, 2023  Content Version: 14.2  © 2024 [employer].   Care instructions adapted under license by [employer]. If you have questions about a medical condition or this instruction, always ask your healthcare professional. [employer], [email or web address] disclaims any warranty or liability for your use of this information.           A Healthy Heart: Care Instructions  Overview     Coronary artery disease, also called heart disease, occurs when a substance called plaque builds up in the vessels that supply oxygen-rich blood to your heart muscle. This can narrow the blood vessels and reduce blood flow. A heart attack happens when blood flow is completely blocked. A high-fat diet, smoking, and other factors increase the risk of heart disease.  Your doctor has found that you have a chance of having heart disease. A heart-healthy lifestyle can help keep your heart healthy and prevent heart disease. This lifestyle includes eating healthy, being active, staying at a weight that's healthy for you, and not smoking or using tobacco. It also includes taking medicines as directed, managing other health conditions, and trying to get a healthy amount of sleep.  Follow-up care is a key part of your treatment and safety. Be sure to make and go to all appointments, and call your doctor if you are having problems. It's also a good idea to know your test results and keep a list of the medicines you take.  How can you care for yourself at home?  Diet    Use less salt

## 2024-10-14 DIAGNOSIS — E78.2 MIXED HYPERLIPIDEMIA: ICD-10-CM

## 2024-10-14 DIAGNOSIS — Z12.5 SCREENING FOR MALIGNANT NEOPLASM OF PROSTATE: ICD-10-CM

## 2024-10-14 LAB
ALBUMIN: 4.3 G/DL (ref 3.5–5.2)
ALP BLD-CCNC: 84 U/L (ref 40–129)
ALT SERPL-CCNC: 16 U/L (ref 0–40)
ANION GAP SERPL CALCULATED.3IONS-SCNC: 16 MMOL/L (ref 7–16)
AST SERPL-CCNC: 22 U/L (ref 0–39)
BILIRUB SERPL-MCNC: 0.4 MG/DL (ref 0–1.2)
BUN BLDV-MCNC: 16 MG/DL (ref 6–23)
CALCIUM SERPL-MCNC: 9.3 MG/DL (ref 8.6–10.2)
CHLORIDE BLD-SCNC: 107 MMOL/L (ref 98–107)
CHOLESTEROL, TOTAL: 211 MG/DL
CO2: 22 MMOL/L (ref 22–29)
CREAT SERPL-MCNC: 0.9 MG/DL (ref 0.7–1.2)
GFR, ESTIMATED: 89 ML/MIN/1.73M2
GLUCOSE BLD-MCNC: 102 MG/DL (ref 74–99)
HCT VFR BLD CALC: 46.3 % (ref 37–54)
HDLC SERPL-MCNC: 49 MG/DL
HEMOGLOBIN: 14.7 G/DL (ref 12.5–16.5)
LDL CHOLESTEROL: 141 MG/DL
MCH RBC QN AUTO: 30.9 PG (ref 26–35)
MCHC RBC AUTO-ENTMCNC: 31.7 G/DL (ref 32–34.5)
MCV RBC AUTO: 97.3 FL (ref 80–99.9)
PDW BLD-RTO: 12.8 % (ref 11.5–15)
PLATELET # BLD: 158 K/UL (ref 130–450)
PMV BLD AUTO: 10.1 FL (ref 7–12)
POTASSIUM SERPL-SCNC: 4.9 MMOL/L (ref 3.5–5)
PROSTATE SPECIFIC ANTIGEN: 1.91 NG/ML (ref 0–4)
RBC # BLD: 4.76 M/UL (ref 3.8–5.8)
SODIUM BLD-SCNC: 145 MMOL/L (ref 132–146)
TOTAL PROTEIN: 6.6 G/DL (ref 6.4–8.3)
TRIGL SERPL-MCNC: 105 MG/DL
TSH SERPL DL<=0.05 MIU/L-ACNC: 4.32 UIU/ML (ref 0.27–4.2)
VLDLC SERPL CALC-MCNC: 21 MG/DL
WBC # BLD: 3.9 K/UL (ref 4.5–11.5)

## 2024-10-15 DIAGNOSIS — E03.9 HYPOTHYROIDISM, UNSPECIFIED TYPE: ICD-10-CM

## 2024-10-15 DIAGNOSIS — E78.2 MIXED HYPERLIPIDEMIA: Primary | ICD-10-CM

## 2025-04-15 SDOH — ECONOMIC STABILITY: TRANSPORTATION INSECURITY
IN THE PAST 12 MONTHS, HAS THE LACK OF TRANSPORTATION KEPT YOU FROM MEDICAL APPOINTMENTS OR FROM GETTING MEDICATIONS?: NO

## 2025-04-15 SDOH — ECONOMIC STABILITY: INCOME INSECURITY: IN THE LAST 12 MONTHS, WAS THERE A TIME WHEN YOU WERE NOT ABLE TO PAY THE MORTGAGE OR RENT ON TIME?: NO

## 2025-04-15 SDOH — ECONOMIC STABILITY: FOOD INSECURITY: WITHIN THE PAST 12 MONTHS, THE FOOD YOU BOUGHT JUST DIDN'T LAST AND YOU DIDN'T HAVE MONEY TO GET MORE.: NEVER TRUE

## 2025-04-15 SDOH — ECONOMIC STABILITY: FOOD INSECURITY: WITHIN THE PAST 12 MONTHS, YOU WORRIED THAT YOUR FOOD WOULD RUN OUT BEFORE YOU GOT MONEY TO BUY MORE.: NEVER TRUE

## 2025-04-15 ASSESSMENT — PATIENT HEALTH QUESTIONNAIRE - PHQ9
2. FEELING DOWN, DEPRESSED OR HOPELESS: NOT AT ALL
1. LITTLE INTEREST OR PLEASURE IN DOING THINGS: NOT AT ALL
SUM OF ALL RESPONSES TO PHQ QUESTIONS 1-9: 0
1. LITTLE INTEREST OR PLEASURE IN DOING THINGS: NOT AT ALL
2. FEELING DOWN, DEPRESSED OR HOPELESS: NOT AT ALL
SUM OF ALL RESPONSES TO PHQ QUESTIONS 1-9: 0
SUM OF ALL RESPONSES TO PHQ9 QUESTIONS 1 & 2: 0
SUM OF ALL RESPONSES TO PHQ QUESTIONS 1-9: 0
SUM OF ALL RESPONSES TO PHQ QUESTIONS 1-9: 0

## 2025-05-02 DIAGNOSIS — E03.9 HYPOTHYROIDISM, UNSPECIFIED TYPE: ICD-10-CM

## 2025-05-02 DIAGNOSIS — E78.2 MIXED HYPERLIPIDEMIA: ICD-10-CM

## 2025-05-02 LAB
ALBUMIN: 4 G/DL (ref 3.5–5.2)
ALP BLD-CCNC: 75 U/L (ref 40–129)
ALT SERPL-CCNC: 19 U/L (ref 0–50)
ANION GAP SERPL CALCULATED.3IONS-SCNC: 10 MMOL/L (ref 7–16)
AST SERPL-CCNC: 32 U/L (ref 0–50)
BASOPHILS ABSOLUTE: 0.04 K/UL (ref 0–0.2)
BASOPHILS RELATIVE PERCENT: 1 % (ref 0–2)
BILIRUB SERPL-MCNC: 0.7 MG/DL (ref 0–1.2)
BUN BLDV-MCNC: 18 MG/DL (ref 8–23)
CALCIUM SERPL-MCNC: 9.3 MG/DL (ref 8.8–10.2)
CHLORIDE BLD-SCNC: 106 MMOL/L (ref 98–107)
CHOLESTEROL, TOTAL: 190 MG/DL
CO2: 25 MMOL/L (ref 22–29)
CREAT SERPL-MCNC: 0.9 MG/DL (ref 0.7–1.2)
EOSINOPHILS ABSOLUTE: 0.11 K/UL (ref 0.05–0.5)
EOSINOPHILS RELATIVE PERCENT: 3 % (ref 0–6)
GFR, ESTIMATED: 89 ML/MIN/1.73M2
GLUCOSE BLD-MCNC: 104 MG/DL (ref 74–99)
HCT VFR BLD CALC: 42.5 % (ref 37–54)
HDLC SERPL-MCNC: 53 MG/DL
HEMOGLOBIN: 14.5 G/DL (ref 12.5–16.5)
IMMATURE GRANULOCYTES %: 0 % (ref 0–5)
IMMATURE GRANULOCYTES ABSOLUTE: <0.03 K/UL (ref 0–0.58)
LDL CHOLESTEROL: 119 MG/DL
LYMPHOCYTES ABSOLUTE: 1.01 K/UL (ref 1.5–4)
LYMPHOCYTES RELATIVE PERCENT: 26 % (ref 20–42)
MCH RBC QN AUTO: 31.9 PG (ref 26–35)
MCHC RBC AUTO-ENTMCNC: 34.1 G/DL (ref 32–34.5)
MCV RBC AUTO: 93.6 FL (ref 80–99.9)
MONOCYTES ABSOLUTE: 0.4 K/UL (ref 0.1–0.95)
MONOCYTES RELATIVE PERCENT: 10 % (ref 2–12)
NEUTROPHILS ABSOLUTE: 2.27 K/UL (ref 1.8–7.3)
NEUTROPHILS RELATIVE PERCENT: 59 % (ref 43–80)
PDW BLD-RTO: 13 % (ref 11.5–15)
PLATELET # BLD: 160 K/UL (ref 130–450)
PMV BLD AUTO: 9.7 FL (ref 7–12)
POTASSIUM SERPL-SCNC: 4.4 MMOL/L (ref 3.5–5.1)
RBC # BLD: 4.54 M/UL (ref 3.8–5.8)
SODIUM BLD-SCNC: 140 MMOL/L (ref 136–145)
T4 FREE: 1.2 NG/DL (ref 0.9–1.7)
TOTAL PROTEIN: 6.6 G/DL (ref 6.4–8.3)
TRIGL SERPL-MCNC: 95 MG/DL
TSH SERPL DL<=0.05 MIU/L-ACNC: 3.35 UIU/ML (ref 0.27–4.2)
VLDLC SERPL CALC-MCNC: 19 MG/DL
WBC # BLD: 3.8 K/UL (ref 4.5–11.5)

## 2025-05-05 ENCOUNTER — OFFICE VISIT (OUTPATIENT)
Dept: PRIMARY CARE CLINIC | Age: 70
End: 2025-05-05
Payer: MEDICARE

## 2025-05-05 VITALS
RESPIRATION RATE: 18 BRPM | DIASTOLIC BLOOD PRESSURE: 84 MMHG | BODY MASS INDEX: 36.16 KG/M2 | HEIGHT: 72 IN | OXYGEN SATURATION: 98 % | SYSTOLIC BLOOD PRESSURE: 122 MMHG | HEART RATE: 54 BPM | WEIGHT: 267 LBS | TEMPERATURE: 97.6 F

## 2025-05-05 DIAGNOSIS — M17.0 PRIMARY OSTEOARTHRITIS OF BOTH KNEES: ICD-10-CM

## 2025-05-05 DIAGNOSIS — Z12.5 SCREENING FOR MALIGNANT NEOPLASM OF PROSTATE: ICD-10-CM

## 2025-05-05 DIAGNOSIS — E78.2 MIXED HYPERLIPIDEMIA: Primary | ICD-10-CM

## 2025-05-05 DIAGNOSIS — R73.9 HYPERGLYCEMIA: ICD-10-CM

## 2025-05-05 DIAGNOSIS — B07.9 WART OF HAND: ICD-10-CM

## 2025-05-05 PROCEDURE — 1160F RVW MEDS BY RX/DR IN RCRD: CPT | Performed by: FAMILY MEDICINE

## 2025-05-05 PROCEDURE — 1159F MED LIST DOCD IN RCRD: CPT | Performed by: FAMILY MEDICINE

## 2025-05-05 PROCEDURE — 1123F ACP DISCUSS/DSCN MKR DOCD: CPT | Performed by: FAMILY MEDICINE

## 2025-05-05 PROCEDURE — G8427 DOCREV CUR MEDS BY ELIG CLIN: HCPCS | Performed by: FAMILY MEDICINE

## 2025-05-05 PROCEDURE — 99214 OFFICE O/P EST MOD 30 MIN: CPT | Performed by: FAMILY MEDICINE

## 2025-05-05 PROCEDURE — G8417 CALC BMI ABV UP PARAM F/U: HCPCS | Performed by: FAMILY MEDICINE

## 2025-05-05 PROCEDURE — 1036F TOBACCO NON-USER: CPT | Performed by: FAMILY MEDICINE

## 2025-05-05 PROCEDURE — 3017F COLORECTAL CA SCREEN DOC REV: CPT | Performed by: FAMILY MEDICINE

## 2025-05-05 ASSESSMENT — ENCOUNTER SYMPTOMS
CHEST TIGHTNESS: 0
BACK PAIN: 0
EYE REDNESS: 0
DIARRHEA: 0
SINUS PRESSURE: 0
EYE PAIN: 0
APNEA: 0
SHORTNESS OF BREATH: 0
RHINORRHEA: 0
WHEEZING: 0
VOMITING: 0
BLOOD IN STOOL: 0
ABDOMINAL PAIN: 0
COLOR CHANGE: 0
SORE THROAT: 0
NAUSEA: 0
COUGH: 0
EYE ITCHING: 0
CONSTIPATION: 0

## 2025-05-05 NOTE — PROGRESS NOTES
Chief Complaint:     Chief Complaint   Patient presents with    6 Month Follow-Up    Hyperlipidemia    Knee Pain         Hyperlipidemia  This is a recurrent problem. The current episode started more than 1 month ago. Recent lipid tests were reviewed and are variable. Exacerbating diseases include obesity. There are no known factors aggravating his hyperlipidemia. Associated symptoms include leg pain. Pertinent negatives include no chest pain, myalgias or shortness of breath. He is currently on no antihyperlipidemic treatment. The current treatment provides no improvement of lipids. There are no compliance problems.  Risk factors for coronary artery disease include dyslipidemia, male sex and obesity.   Knee Pain   The incident occurred more than 1 week ago. The incident occurred at home. There was no injury mechanism. The pain is present in the left knee and right knee. The quality of the pain is described as aching. The pain is moderate. Associated symptoms include an inability to bear weight, a loss of motion, muscle weakness, numbness and tingling. The symptoms are aggravated by movement, palpation and weight bearing. Treatments tried: Had stem cells and PRP injections. The treatment provided moderate relief.       Patient Active Problem List   Diagnosis    Paresthesia of both feet    Hyperlipidemia    Primary osteoarthritis of both knees    Severe obesity (BMI 35.0-39.9) with comorbidity (HCC)       Past Medical History:   Diagnosis Date    Colon cancer screening        Past Surgical History:   Procedure Laterality Date    COLONOSCOPY N/A 4/30/2024    COLONOSCOPY BIOPSY performed by Carter Mcgrath MD at [facility] ENDOSCOPY    TONSILLECTOMY         Current Outpatient Medications   Medication Sig Dispense Refill    vitamin C (ASCORBIC ACID) 500 MG tablet Take 1 tablet by mouth daily      Multiple Vitamins-Minerals (THERAPEUTIC MULTIVITAMIN-MINERALS) tablet Take 1 tablet by mouth daily      zinc 50 MG TABS tablet Take 1

## (undated) DEVICE — SPONGE GZ W4XL4IN RAYON POLY CVR W/NONWOVEN FAB STRL 2/PK

## (undated) DEVICE — GRADUATE TRIANG MEASURE 1000ML BLK PRNT

## (undated) DEVICE — FORCEPS BX L240CM JAW DIA2.4MM ORNG L CAP W/ NDL DISP RAD